# Patient Record
Sex: MALE | Employment: OTHER | URBAN - METROPOLITAN AREA
[De-identification: names, ages, dates, MRNs, and addresses within clinical notes are randomized per-mention and may not be internally consistent; named-entity substitution may affect disease eponyms.]

---

## 2017-03-08 ENCOUNTER — OFFICE VISIT (OUTPATIENT)
Dept: AUDIOLOGY | Facility: CLINIC | Age: 67
End: 2017-03-08

## 2017-03-08 DIAGNOSIS — H90.3 SENSORY HEARING LOSS, BILATERAL: Primary | ICD-10-CM

## 2017-03-08 NOTE — MR AVS SNAPSHOT
After Visit Summary   3/8/2017    oNemí Lassiter    MRN: [id number]           Patient Information     Date Of Birth          1950        Visit Information        Provider Department      3/8/2017 1:40 PM Aubree Hernandez AuD; [facility] Audiology        Today's Diagnoses     Sensory hearing loss, bilateral    -  1       Follow-ups after your visit        Your next 10 appointments already scheduled     Apr 21, 2017  9:30 AM CDT   (Arrive by 9:15 AM)   New Patient Visit with Kota Decker MD   [facility] Ear Nose and Throat ([facility])    [address] [place] [id number]   [phone]            Apr 25, 2017  1:00 PM CDT   (Arrive by 12:45 PM)   Hearing Aid Evaluation with Alexis Blankenship [facility] Audiology ([facility])    [address] [place] [id number]   [phone]           Please see your medical professional for ear cleaning prior to this appointment if you believe wax buildup may be an issue. All patients are required to have a physician's order stating the medical reason for the hearing test. Your doctor can send an electronic order, use their own form or we have provided a form (called Physician's Order for Audiology Services). It states that there is a medical reason for your exam. Without an order you may need to be rescheduled until the order can be obtained.            Apr 26, 2017  2:30 PM CDT   Hearing Aid Fitting with Alexis Blankenship [facility] Audiology ([facility])    [address] [place] [id number]   [phone]            May 19, 2017  3:00 PM CDT   (Arrive by 2:45 PM)   Initial Review Program with Alexis Blankenship [facility] Audiology ([facility])    [address] [place] [phone]   [phone]              Who to contact      Please call your clinic at [phone] to:    Ask questions about your health    Make or cancel appointments    Discuss your medicines    Learn about your test results    Speak to your doctor   If you have compliments or concerns about an experience at your clinic, or if you wish to file a complaint, please contact [facility] Patient Relations at [phone] or email us at [email or web address]         Additional Information About Your Visit        [employer] Information     [employer] is an electronic gateway that provides easy, online access to your medical records. With [employer], you can request a clinic appointment, read your test results, renew a prescription or communicate with your care team.     To sign up for [employer] visit the website at [email or web address]   You will be asked to enter the access code listed below, as well as some personal information. Please follow the directions to create your username and password.     Your access code is: [email or web address]  Expires: 2017 12:04 PM     Your access code will  in 90 days. If you need help or a new code, please contact your [facility] or call [phone] for assistance.        Care EveryWhere ID     This is your Care EveryWhere ID. This could be used by other organizations to access your [place] medical records  [id number]         Blood Pressure from Last 3 Encounters:   No data found for BP    Weight from Last 3 Encounters:   No data found for Wt              We Performed the Following     Hearing Aid Exam, Binaural (36655)     Speech Audiometry Threshold   (29009)        Primary Care Provider    None Specified       No primary provider on file.        Thank you!     Thank you for choosing [facility] AUDIOLOGY  for your care. Our goal is always to provide you with excellent care. Hearing back from our patients is one way we can continue to improve our services. Please take a few  minutes to complete the written survey that you may receive in the mail after your visit with us. Thank you!             Your Updated Medication List - Protect others around you: Learn how to safely use, store and throw away your medicines at www.disposemymeds.org.      Notice  As of 3/8/2017  2:54 PM    You have not been prescribed any medications.

## 2017-03-08 NOTE — PROGRESS NOTES
AUDIOLOGY REPORT    SUBJECTIVE: Noemí Lassiter is a 67 year old male was seen in the Audiology Clinic at  [facility] on 3/08/17 to discuss concerns with hearing and functional communication difficulties. The patient was accompanied by their . Noemí has been seen previously on at an out side clinic on 08/26/2016, and results revealed a borderline normal sloping to moderately-severe sensorineural hearing lossbilaterally.Noemí notes difficulty with communication in a variety of listening situations.    OBJECTIVE:  As speech testing was not completed at patients hearing test, this was completed today. Could not restest hearing as no order in system.    SAT: Right: 25 dB           Left: 20 dB    Word Recognition Testing:  Right:96% using Israeli word list via live voice  Left: 100% using Israeli word list via live voice    Patient is a hearing aid candidate. Patient would like to move forward with a hearing aid evaluation today. Therefore, the patient was presented with different options for amplification to help aid in communication. Discussed styles, levels of technology and monaural vs. binaural fitting.     The hearing aid(s) mutually chosen were:  Binaural: Unitron Stride 700 HS  COLOR: Brown  BATTERY SIZE: 312  EARMOLD/TIPS: half shell    Otoscopy revealed partial obstruction with cerumen bilaterally. Bilateral earmolds were taken without incident.    ASSESSMENT:   No diagnosis found.    Reviewed purchase information and warranty information with patient. The 45 day trial period was explained to patient. The patient was given a copy of the [place] Department of Health consumer brochure on purchasing hearing instruments. Patient risk factors have been provided to the patient in writing prior to the sale of the hearing aid per FDA regulation. The risk factors are also available in the User Instructional Booklet to be presented on the day of the hearing aid fitting. Hearing  aid(s) ordered. Hearing aid evaluation completed.    PLAN: Noemí is scheduled to return in 2-3 weeks for a hearing aid fitting and programming.Due to his insurance he does need to gain medical clearance before fitting. Gave him the option to return to his original doctor, or see one of our ENT's. He would like to be seen here. Purchase agreement will be completed on that date. Please contact this clinic with any questions or concerns.        Nayely Erickson., [facility]  Licensed Audiologist  [place] #[id number]

## 2017-04-14 ENCOUNTER — PRE VISIT (OUTPATIENT)
Dept: OTOLARYNGOLOGY | Facility: CLINIC | Age: 67
End: 2017-04-14

## 2017-04-14 NOTE — TELEPHONE ENCOUNTER
1.  Date/reason for appt:  4/21/17   [facility] Medical Clearance    2.  Referring provider:  Alexis Hernandez    3.  Call to patient (Yes / No - short description):  No, referred.  Records reviewed.  All records are in Epic

## 2017-04-21 ENCOUNTER — OFFICE VISIT (OUTPATIENT)
Dept: OTOLARYNGOLOGY | Facility: CLINIC | Age: 67
End: 2017-04-21

## 2017-04-21 VITALS — BODY MASS INDEX: 28.99 KG/M2 | HEIGHT: 65 IN | WEIGHT: 174 LBS

## 2017-04-21 DIAGNOSIS — H90.5 SNHL (SENSORINEURAL HEARING LOSS): Primary | ICD-10-CM

## 2017-04-21 RX ORDER — ACETAMINOPHEN 325 MG/1
650 TABLET ORAL EVERY 6 HOURS PRN
COMMUNITY

## 2017-04-21 RX ORDER — IBUPROFEN 600 MG/1
600 TABLET, FILM COATED ORAL EVERY 6 HOURS PRN
COMMUNITY
Start: 2016-02-03

## 2017-04-21 RX ORDER — ASPIRIN 81 MG/1
81 TABLET ORAL
COMMUNITY
Start: 2015-07-31 | End: 2017-12-11

## 2017-04-21 RX ORDER — MULTIVITAMIN
TABLET ORAL
COMMUNITY
Start: 2013-10-23

## 2017-04-21 RX ORDER — TAMSULOSIN HYDROCHLORIDE 0.4 MG/1
0.8 CAPSULE ORAL DAILY
COMMUNITY
Start: 2014-12-16

## 2017-04-21 RX ORDER — CYCLOBENZAPRINE HCL 10 MG
10 TABLET ORAL
COMMUNITY
Start: 2014-12-12 | End: 2017-05-04

## 2017-04-21 RX ORDER — VITAMIN B COMPLEX
TABLET ORAL
COMMUNITY
Start: 2015-07-31

## 2017-04-21 RX ORDER — GABAPENTIN 100 MG/1
100 CAPSULE ORAL
COMMUNITY
Start: 2015-04-27 | End: 2017-05-04

## 2017-04-21 RX ORDER — CAPSAICIN 0.025 %
CREAM (GRAM) TOPICAL
COMMUNITY
Start: 2014-12-12 | End: 2017-11-07

## 2017-04-21 RX ORDER — DOXAZOSIN 4 MG/1
4 TABLET ORAL
COMMUNITY
End: 2017-05-04

## 2017-04-21 RX ORDER — METOPROLOL SUCCINATE 50 MG/1
50 TABLET, EXTENDED RELEASE ORAL
COMMUNITY
Start: 2015-07-31 | End: 2017-05-04

## 2017-04-21 RX ORDER — LOSARTAN POTASSIUM AND HYDROCHLOROTHIAZIDE 25; 100 MG/1; MG/1
TABLET ORAL
COMMUNITY
Start: 2015-06-02 | End: 2017-05-04

## 2017-04-21 ASSESSMENT — PAIN SCALES - GENERAL: PAINLEVEL: NO PAIN (0)

## 2017-04-21 NOTE — LETTER
Date:April 24, 2017      Patient was self referred, no letter generated. Do not send.        [facility] Health Information

## 2017-04-21 NOTE — NURSING NOTE
Chief Complaint   Patient presents with     Consult     hearng loss. Pressure in left ear     Fab Wilson LPN

## 2017-04-21 NOTE — LETTER
4/21/2017       RE: Noemí Lassiter  [address]  [facility] [address]     Dear Colleague,    Thank you for referring your patient, Noemí Lassiter, to the [facility] EAR NOSE AND THROAT at [facility]. Please see a copy of my visit note below.    The patient presents with a history of hearing loss in both ears.  The patient reports a progressive hearing loss in both ears over at least the past few years.  The patient denies ear infections, otalgia, otorrhea, dizziness, or tinnitus.  The patient denies sinusitis, rhinitis, facial pain, nasal obstruction or purulent nasal discharge. The patient denies chronic or recurrent tonsillitis, chronic or recurrent pharyngitis. The patient's outside Audiogram and Tympanogram are reviewed with him and these demonstrate bilateral moderate sensorineural hearing loss that is sloping and symmetric to a severe sensorineural hearing loss in the higher frequencies. His word recognition scores are very good and his tympanograms are normal bilaterally.     This patient is seen in consultation as a self referral to my clinic.     All other systems were reviewed and they are either negative or they are not directly pertinent to this Otolaryngology examination.      Past Medical History:    No past medical history on file.    Past Surgical History:    No past surgical history on file.    Medications:      Current Outpatient Prescriptions:      acetaminophen (TYLENOL) 325 MG tablet, Take 650 mg by mouth, Disp: , Rfl:      aspirin EC 81 MG EC tablet, Take 81 mg by mouth, Disp: , Rfl:      capsaicin (ZOSTRIX) 0.025 % CREA cream, , Disp: , Rfl:      cyclobenzaprine (FLEXERIL) 10 MG tablet, Take 10 mg by mouth, Disp: , Rfl:      Multiple Vitamin (DAILY VITES) TABS, TAKE 1 TABLET BY MOUTH EVERY DAY, Disp: , Rfl:      doxazosin (CARDURA) 4 MG tablet, Take 4 mg by mouth, Disp: , Rfl:      gabapentin (NEURONTIN) 100 MG capsule, Take 100 mg by mouth,  Disp: , Rfl:      ibuprofen (ADVIL/MOTRIN) 600 MG tablet, Take 600 mg by mouth, Disp: , Rfl:      losartan-hydrochlorothiazide (HYZAAR) 100-25 MG per tablet, , Disp: , Rfl:      metoprolol (TOPROL-XL) 50 MG 24 hr tablet, Take 50 mg by mouth, Disp: , Rfl:      tamsulosin (FLOMAX) 0.4 MG capsule, Take 0.4 mg by mouth, Disp: , Rfl:      B Complex Vitamins (VITAMIN-B COMPLEX) TABS, , Disp: , Rfl:      cholecalciferol (D 2000) 2000 UNITS tablet, Take 2,000 Units by mouth, Disp: , Rfl:     Allergies:    Review of patient's allergies indicates no known allergies.    Physical Examination:    The patient is a well developed, well nourished male in no apparent distress.  He is normocephalic, atraumatic with pupils equally round and reactive to light.    Oral Cavity Examination:  Normal mucosa with no masses or lesions  Nasal Examination: Normal mucosa with no masses or lesions  Ear Examination: Ear canals clear, tympanic membranes and middle ear spaces normal  Neurological Examination: Facial nerve function intact and symmetric  Integumentary Examination: No lesions on the skin of the head and neck  Neck Examination: No masses or lesions, no lymphadenopathy  Endocrine Examination: Normal thyroid examination    Assessment and Plan:    The patient presents with a history of bilateral sensorineural hearing loss. Based upon his outside Audiogram and Tympanogram, the patient will be referred for a hearing aid consultation and any needed Audiogram and Tympanogram testing as indicated.         Again, thank you for allowing me to participate in the care of your patient.      Sincerely,    Kota Decker MD

## 2017-04-21 NOTE — MR AVS SNAPSHOT
After Visit Summary   4/21/2017    Noemí Lassiter    MRN: [id number]           Patient Information     Date Of Birth          1950        Visit Information        Provider Department      4/21/2017 9:15 AM Kota Decker MD; [employer] [facility] Ear Nose and Throat        Today's Diagnoses     SNHL (sensorineural hearing loss)    -  1      Care Instructions    The patient presents with a history of bilateral sensorineural hearing loss. Based upon his outside Audiogram and Tympanogram, the patient will be referred for a hearing aid consultation and any needed Audiogram and Tympanogram testing as indicated.           Follow-ups after your visit        Your next 10 appointments already scheduled     Apr 26, 2017  2:30 PM CDT   Hearing Aid Fitting with Alexis Blankenship [facility] Audiology ([facility])    [address] [place] [address] [phone]            May 19, 2017  3:00 PM CDT   (Arrive by 2:45 PM)   Initial Review Program with Alexis Blankenship [facility] Audiology ([facility])    [address] [place] [address] [phone]              Who to contact     Please call your clinic at [phone] to:    Ask questions about your health    Make or cancel appointments    Discuss your medicines    Learn about your test results    Speak to your doctor   If you have compliments or concerns about an experience at your clinic, or if you wish to file a complaint, please contact [facility] Patient Relations at [phone] or email us at [email or web address]         Additional Information About Your Visit        MyChart Information     [employer] is an electronic gateway that provides easy, online access to your medical records. With [employer], you can request a clinic appointment, read your test results, renew a prescription or communicate  "with your care team.     To sign up for [place] community healthhart visit the website at [email or web address]   You will be asked to enter the access code listed below, as well as some personal information. Please follow the directions to create your username and password.     Your access code is: [email or web address]  Expires: 2017  1:04 PM     Your access code will  in 90 days. If you need help or a new code, please contact your [facility] or call [phone] for assistance.        Care EveryWhere ID     This is your Care EveryWhere ID. This could be used by other organizations to access your [place] medical records  [id number]        Your Vitals Were     Height BMI (Body Mass Index)                1.65 m (5' 4.96\") 28.99 kg/m2           Blood Pressure from Last 3 Encounters:   No data found for BP    Weight from Last 3 Encounters:   17 78.9 kg (174 lb)              We Performed the Following     AUDIO REVIEW/CONSULT        Primary Care Provider    None Specified       No primary provider on file.        Thank you!     Thank you for choosing [facility] EAR NOSE AND THROAT  for your care. Our goal is always to provide you with excellent care. Hearing back from our patients is one way we can continue to improve our services. Please take a few minutes to complete the written survey that you may receive in the mail after your visit with us. Thank you!             Your Updated Medication List - Protect others around you: Learn how to safely use, store and throw away your medicines at www.disposemymeds.org.          This list is accurate as of: 17  9:46 AM.  Always use your most recent med list.                   Brand Name Dispense Instructions for use    acetaminophen 325 MG tablet    TYLENOL     Take 650 mg by mouth       aspirin EC 81 MG EC tablet      Take 81 mg by mouth       capsaicin 0.025 % Crea cream    ZOSTRIX         cyclobenzaprine 10 MG tablet    FLEXERIL     Take 10 mg by " mouth       D 2000 2000 UNITS tablet   Generic drug:  cholecalciferol      Take 2,000 Units by mouth       DAILY VITES Tabs      TAKE 1 TABLET BY MOUTH EVERY DAY       doxazosin 4 MG tablet    CARDURA     Take 4 mg by mouth       gabapentin 100 MG capsule    NEURONTIN     Take 100 mg by mouth       ibuprofen 600 MG tablet    ADVIL/MOTRIN     Take 600 mg by mouth       losartan-hydrochlorothiazide 100-25 MG per tablet    HYZAAR         metoprolol 50 MG 24 hr tablet    TOPROL-XL     Take 50 mg by mouth       tamsulosin 0.4 MG capsule    FLOMAX     Take 0.4 mg by mouth       Vitamin-B Complex Tabs

## 2017-04-21 NOTE — PROGRESS NOTES
The patient presents with a history of hearing loss in both ears.  The patient reports a progressive hearing loss in both ears over at least the past few years.  The patient denies ear infections, otalgia, otorrhea, dizziness, or tinnitus.  The patient denies sinusitis, rhinitis, facial pain, nasal obstruction or purulent nasal discharge. The patient denies chronic or recurrent tonsillitis, chronic or recurrent pharyngitis. The patient's outside Audiogram and Tympanogram are reviewed with him and these demonstrate bilateral moderate sensorineural hearing loss that is sloping and symmetric to a severe sensorineural hearing loss in the higher frequencies. His word recognition scores are very good and his tympanograms are normal bilaterally.     This patient is seen in consultation as a self referral to my clinic.     All other systems were reviewed and they are either negative or they are not directly pertinent to this Otolaryngology examination.      Past Medical History:    No past medical history on file.    Past Surgical History:    No past surgical history on file.    Medications:      Current Outpatient Prescriptions:      acetaminophen (TYLENOL) 325 MG tablet, Take 650 mg by mouth, Disp: , Rfl:      aspirin EC 81 MG EC tablet, Take 81 mg by mouth, Disp: , Rfl:      capsaicin (ZOSTRIX) 0.025 % CREA cream, , Disp: , Rfl:      cyclobenzaprine (FLEXERIL) 10 MG tablet, Take 10 mg by mouth, Disp: , Rfl:      Multiple Vitamin (DAILY VITES) TABS, TAKE 1 TABLET BY MOUTH EVERY DAY, Disp: , Rfl:      doxazosin (CARDURA) 4 MG tablet, Take 4 mg by mouth, Disp: , Rfl:      gabapentin (NEURONTIN) 100 MG capsule, Take 100 mg by mouth, Disp: , Rfl:      ibuprofen (ADVIL/MOTRIN) 600 MG tablet, Take 600 mg by mouth, Disp: , Rfl:      losartan-hydrochlorothiazide (HYZAAR) 100-25 MG per tablet, , Disp: , Rfl:      metoprolol (TOPROL-XL) 50 MG 24 hr tablet, Take 50 mg by mouth, Disp: , Rfl:      tamsulosin (FLOMAX) 0.4 MG capsule,  Take 0.4 mg by mouth, Disp: , Rfl:      B Complex Vitamins (VITAMIN-B COMPLEX) TABS, , Disp: , Rfl:      cholecalciferol (D 2000) 2000 UNITS tablet, Take 2,000 Units by mouth, Disp: , Rfl:     Allergies:    Review of patient's allergies indicates no known allergies.    Physical Examination:    The patient is a well developed, well nourished male in no apparent distress.  He is normocephalic, atraumatic with pupils equally round and reactive to light.    Oral Cavity Examination:  Normal mucosa with no masses or lesions  Nasal Examination: Normal mucosa with no masses or lesions  Ear Examination: Ear canals clear, tympanic membranes and middle ear spaces normal  Neurological Examination: Facial nerve function intact and symmetric  Integumentary Examination: No lesions on the skin of the head and neck  Neck Examination: No masses or lesions, no lymphadenopathy  Endocrine Examination: Normal thyroid examination    Assessment and Plan:    The patient presents with a history of bilateral sensorineural hearing loss. Based upon his outside Audiogram and Tympanogram, the patient will be referred for a hearing aid consultation and any needed Audiogram and Tympanogram testing as indicated.

## 2017-04-21 NOTE — PATIENT INSTRUCTIONS
The patient presents with a history of bilateral sensorineural hearing loss. Based upon his outside Audiogram and Tympanogram, the patient will be referred for a hearing aid consultation and any needed Audiogram and Tympanogram testing as indicated.

## 2017-04-26 ENCOUNTER — OFFICE VISIT (OUTPATIENT)
Dept: AUDIOLOGY | Facility: CLINIC | Age: 67
End: 2017-04-26

## 2017-04-26 DIAGNOSIS — H90.3 SENSORY HEARING LOSS, BILATERAL: Primary | ICD-10-CM

## 2017-04-26 NOTE — MR AVS SNAPSHOT
After Visit Summary   4/26/2017    Noemí Lassiter    MRN: [id number]           Patient Information     Date Of Birth          1950        Visit Information        Provider Department      4/26/2017 2:15 PM Aubree Hernandez AuD; [facility] Audiology        Today's Diagnoses     Sensory hearing loss, bilateral    -  1       Follow-ups after your visit        Your next 10 appointments already scheduled     May 04, 2017  2:50 PM CDT   (Arrive by 2:35 PM)   New Patient Visit with Bay Miller MD   [facility] ([facility])    [address] [place] [address] [phone]            May 19, 2017  3:00 PM CDT   (Arrive by 2:45 PM)   Initial Review Program with Alexis Blankenship   [facility] Audiology ([facility])    [address] [place] [address] [phone]              Who to contact     Please call your clinic at [phone] to:    Ask questions about your health    Make or cancel appointments    Discuss your medicines    Learn about your test results    Speak to your doctor   If you have compliments or concerns about an experience at your clinic, or if you wish to file a complaint, please contact [facility] Patient Relations at [phone] or email us at [email or web address]         Additional Information About Your Visit        MyChart Information     B[employer] is an electronic gateway that provides easy, online access to your medical records. With [employer], you can request a clinic appointment, read your test results, renew a prescription or communicate with your care team.     To sign up for [employer] visit the website at [email or web address]   You will be asked to enter the access code listed below, as well as some personal information. Please follow the directions to create your username and password.     Your  access code is: [email or web address]  Expires: 2017  6:30 AM     Your access code will  in 90 days. If you need help or a new code, please contact your [facility] Clinic or call [phone] for assistance.        Care EveryWhere ID     This is your Care EveryWhere ID. This could be used by other organizations to access your [place] medical records  [id number]         Blood Pressure from Last 3 Encounters:   No data found for BP    Weight from Last 3 Encounters:   17 78.9 kg (174 lb)              We Performed the Following     Hearing Aid Fitting        Primary Care Provider    None Specified       No primary provider on file.        Thank you!     Thank you for choosing [facility] AUDIOLOGY  for your care. Our goal is always to provide you with excellent care. Hearing back from our patients is one way we can continue to improve our services. Please take a few minutes to complete the written survey that you may receive in the mail after your visit with us. Thank you!             Your Updated Medication List - Protect others around you: Learn how to safely use, store and throw away your medicines at www.disposemymeds.org.          This list is accurate as of: 17  6:23 PM.  Always use your most recent med list.                   Brand Name Dispense Instructions for use    acetaminophen 325 MG tablet    TYLENOL     Take 650 mg by mouth       aspirin EC 81 MG EC tablet      Take 81 mg by mouth       capsaicin 0.025 % Crea cream    ZOSTRIX         cyclobenzaprine 10 MG tablet    FLEXERIL     Take 10 mg by mouth       D 2000 2000 UNITS tablet   Generic drug:  cholecalciferol      Take 2,000 Units by mouth       DAILY VITES Tabs      TAKE 1 TABLET BY MOUTH EVERY DAY       doxazosin 4 MG tablet    CARDURA     Take 4 mg by mouth       gabapentin 100 MG capsule    NEURONTIN     Take 100 mg by mouth       ibuprofen 600 MG tablet    ADVIL/MOTRIN     Take 600 mg by mouth        losartan-hydrochlorothiazide 100-25 MG per tablet    HYZAAR         metoprolol 50 MG 24 hr tablet    TOPROL-XL     Take 50 mg by mouth       tamsulosin 0.4 MG capsule    FLOMAX     Take 0.4 mg by mouth       Vitamin-B Complex Tabs

## 2017-04-26 NOTE — PROGRESS NOTES
AUDIOLOGY REPORT    SUBJECTIVE: Noemí Lassiter is a 67 year old male who was seen in Audiology at the [facility] [place] for a fitting of Unitron Stride 700 half-shell hearing aids. Previous results have revealed a bilateral mild sloping to moderately severe  sensorineural hearing loss. The patient was given medical clearance to pursue amplification by  Kota Decker MD.. Today he was accompanied by an .    OBJECTIVE: The hearing aid conformity evaluation was completed.The hearing aids were placed and they provided a good fit. Real-ear-probe-microphone measurements were completed on the [employer] system and were a good match to NAL-NL1 target with soft sounds audible, moderate sounds comfortable, and loud sounds below discomfort. UCLs are verified through maximum power output measures and demonstrate appropriate limiting of loud inputs. Noemí was oriented to proper hearing aid use, care, cleaning (no water, dry brush), batteries (size 312, insertion/removal, low-battery signal), aid insertion/removal, warranty information, storage cases, and other hearing aid details. The patient confirmed understanding of hearing aid use and care, and showed proper insertion of hearing aid and batteries while in the office today. Noemí reported good volume and sound quality today.   Hearing aids were programmed as follows:  Program 1: Automatic program  Push button was disabled.    ASSESSMENT: Unitron Stride 700 half-shell hearing aids were fit today. Verification measures were performed. Noemí signed the Hearing Aid Purchase Agreement and was given a copy, as well as details on his hearing aids.    PLAN:Noemí will return for follow-up in 2-3 weeks for a hearing aid review appointment, at which time we will review use of the dry jar and wax traps. Please call this clinic with questions regarding today s appointment.    Nayely Erickson., [facility]  Licensed Audiologist  [place]  #[id number]                  +

## 2017-05-04 ENCOUNTER — OFFICE VISIT (OUTPATIENT)
Dept: INTERNAL MEDICINE | Facility: CLINIC | Age: 67
End: 2017-05-04

## 2017-05-04 VITALS
DIASTOLIC BLOOD PRESSURE: 77 MMHG | BODY MASS INDEX: 27.97 KG/M2 | WEIGHT: 174 LBS | SYSTOLIC BLOOD PRESSURE: 144 MMHG | HEIGHT: 66 IN | RESPIRATION RATE: 16 BRPM | HEART RATE: 69 BPM

## 2017-05-04 DIAGNOSIS — E78.5 HYPERLIPIDEMIA, UNSPECIFIED HYPERLIPIDEMIA TYPE: ICD-10-CM

## 2017-05-04 DIAGNOSIS — E11.9 TYPE 2 DIABETES MELLITUS WITHOUT COMPLICATION, WITHOUT LONG-TERM CURRENT USE OF INSULIN (H): ICD-10-CM

## 2017-05-04 DIAGNOSIS — T78.40XA ALLERGIC REACTION, INITIAL ENCOUNTER: ICD-10-CM

## 2017-05-04 DIAGNOSIS — R60.0 LOCALIZED EDEMA: ICD-10-CM

## 2017-05-04 DIAGNOSIS — R30.0 DYSURIA: Primary | ICD-10-CM

## 2017-05-04 DIAGNOSIS — R30.0 DYSURIA: ICD-10-CM

## 2017-05-04 DIAGNOSIS — Z00.00 ENCOUNTER FOR ROUTINE ADULT HEALTH EXAMINATION WITHOUT ABNORMAL FINDINGS: ICD-10-CM

## 2017-05-04 DIAGNOSIS — I10 BENIGN ESSENTIAL HYPERTENSION: ICD-10-CM

## 2017-05-04 LAB
ALBUMIN UR-MCNC: NEGATIVE MG/DL
ANION GAP SERPL CALCULATED.3IONS-SCNC: 7 MMOL/L (ref 3–14)
APPEARANCE UR: CLEAR
BILIRUB UR QL STRIP: NEGATIVE
BUN SERPL-MCNC: 15 MG/DL (ref 7–30)
CALCIUM SERPL-MCNC: 8.9 MG/DL (ref 8.5–10.1)
CHLORIDE SERPL-SCNC: 107 MMOL/L (ref 94–109)
CHOLEST SERPL-MCNC: 163 MG/DL
CO2 SERPL-SCNC: 27 MMOL/L (ref 20–32)
COLOR UR AUTO: YELLOW
CREAT SERPL-MCNC: 0.82 MG/DL (ref 0.66–1.25)
CREAT UR-MCNC: 156 MG/DL
GFR SERPL CREATININE-BSD FRML MDRD: ABNORMAL ML/MIN/1.7M2
GLUCOSE SERPL-MCNC: 120 MG/DL (ref 70–99)
GLUCOSE UR STRIP-MCNC: 50 MG/DL
HBA1C MFR BLD: 7.8 % (ref 4.3–6)
HDLC SERPL-MCNC: 51 MG/DL
HGB UR QL STRIP: NEGATIVE
KETONES UR STRIP-MCNC: NEGATIVE MG/DL
LDLC SERPL CALC-MCNC: 101 MG/DL
LEUKOCYTE ESTERASE UR QL STRIP: NEGATIVE
MICROALBUMIN UR-MCNC: 8 MG/L
MICROALBUMIN/CREAT UR: 4.97 MG/G CR (ref 0–17)
MUCOUS THREADS #/AREA URNS LPF: PRESENT /LPF
NITRATE UR QL: NEGATIVE
NONHDLC SERPL-MCNC: 112 MG/DL
PH UR STRIP: 7 PH (ref 5–7)
POTASSIUM SERPL-SCNC: 4.1 MMOL/L (ref 3.4–5.3)
RBC #/AREA URNS AUTO: 1 /HPF (ref 0–2)
SODIUM SERPL-SCNC: 142 MMOL/L (ref 133–144)
SP GR UR STRIP: 1.02 (ref 1–1.03)
TRIGL SERPL-MCNC: 55 MG/DL
URN SPEC COLLECT METH UR: ABNORMAL
UROBILINOGEN UR STRIP-MCNC: 0 MG/DL (ref 0–2)
WBC #/AREA URNS AUTO: <1 /HPF (ref 0–2)

## 2017-05-04 RX ORDER — HYDROCHLOROTHIAZIDE 12.5 MG/1
25 TABLET ORAL DAILY
Qty: 60 TABLET | Refills: 0 | Status: SHIPPED | OUTPATIENT
Start: 2017-05-04 | End: 2017-10-06

## 2017-05-04 RX ORDER — LOSARTAN POTASSIUM 100 MG/1
100 TABLET ORAL DAILY
Qty: 90 TABLET | Refills: 0 | COMMUNITY
Start: 2017-05-04 | End: 2022-11-18

## 2017-05-04 RX ORDER — CETIRIZINE HYDROCHLORIDE 10 MG/1
10 TABLET ORAL DAILY PRN
Qty: 30 TABLET | COMMUNITY
Start: 2017-05-04 | End: 2017-11-07

## 2017-05-04 RX ORDER — SIMVASTATIN 40 MG
40 TABLET ORAL AT BEDTIME
Qty: 30 TABLET | COMMUNITY
Start: 2017-05-04

## 2017-05-04 RX ORDER — GLIPIZIDE 10 MG/1
10 TABLET ORAL DAILY
Qty: 30 TABLET | COMMUNITY
Start: 2017-05-04 | End: 2017-08-03 | Stop reason: ALTCHOICE

## 2017-05-04 ASSESSMENT — ENCOUNTER SYMPTOMS
MUSCLE CRAMPS: 0
STIFFNESS: 0
ARTHRALGIAS: 0
COUGH: 0
HEMATURIA: 0
NECK PAIN: 0
DYSURIA: 1
MYALGIAS: 0
SNORES LOUDLY: 0
BACK PAIN: 1
SMELL DISTURBANCE: 0
POSTURAL DYSPNEA: 0
DYSPNEA ON EXERTION: 0
COUGH DISTURBING SLEEP: 0
MUSCLE WEAKNESS: 0
NECK MASS: 0
SINUS PAIN: 1
SINUS CONGESTION: 0
TROUBLE SWALLOWING: 0
SORE THROAT: 0
SHORTNESS OF BREATH: 1
TASTE DISTURBANCE: 0
RESPIRATORY PAIN: 0
SPUTUM PRODUCTION: 0
FLANK PAIN: 0
WHEEZING: 1
DIFFICULTY URINATING: 1
HEMOPTYSIS: 0
JOINT SWELLING: 0

## 2017-05-04 ASSESSMENT — ACTIVITIES OF DAILY LIVING (ADL): DO_MEMBERS_OF_YOUR_HOUSEHOLD_WEAR_SEAT_BELTS?: Y

## 2017-05-04 ASSESSMENT — PAIN SCALES - GENERAL: PAINLEVEL: NO PAIN (0)

## 2017-05-04 NOTE — NURSING NOTE
Chief Complaint   Patient presents with     Establish Care     Pt to here to establish care.      Destiny Fuentes LPN May 4, 2017 2:46 PM

## 2017-05-04 NOTE — MR AVS SNAPSHOT
After Visit Summary   5/4/2017    Noemí Lassiter    MRN: [id number]           Patient Information     Date Of Birth          1950        Visit Information        Provider Department      5/4/2017 2:30 PM Bay Miller MD; [facility]        Today's Diagnoses     Dysuria    -  1    Encounter for routine adult health examination without abnormal findings        Type 2 diabetes mellitus without complication, without long-term current use of insulin (H)        Localized edema        Benign essential hypertension          Care Instructions    Primary Care Center Medication Refill Request Information:  * Please contact your pharmacy regarding ANY request for medication refills.  ** [facility] Prescription Fax = [phone]  * Please allow 3 business days for routine medication refills.  * Please allow 5 business days for controlled substance medication refills.     Primary Care Center Test Result notification information:  *You will be notified within 7-10 days of your appointment day regarding the results of your test.  If you are on MyChart you will be notified as soon as the provider has reviewed the results and signed off on them.    Please schedule the following appointments:  Ophthalmology (Eye) [phone]  ([facility] 4th floor)    Diabetes with High Blood Sugar  You have been treated for high blood sugar (hyperglycemia). This may be because of an infection or other illness, eating too many sweets or starches, or not taking enough insulin.  Home care  Monitor and write down your blood sugar level at least twice a day. Do this before breakfast and before dinner. If you take insulin, record your insulin dose as well. Do this for the next 3 to 5 days.  High blood sugar may cause symptoms that you can learn to recognize, such as:    Frequent urination    Thirst    Dizziness    Headache    Nausea or vomiting    Abdominal pain    Drowsiness or loss of  "consciousness  If you experience high-blood-sugar symptoms, use a blood or urine test to find out what your blood sugar level is. If it is above your usual range, use the \"sliding scale\" regular insulin dose prescribed by your healthcare provider. If you were not given a range for your insulin dose, contact your health care provider for more advice.  Follow-up care  Follow up with your health care provider, or as advised. You may need to meet with your health care provider in the next week. You will likely review your blood sugar records. You may also talk to your health care provider about adjusting your dose of insulin or other medicine for blood sugar.  When to seek medical advice  Call your health care provider right away if these occur:    High blood sugar symptoms (Symptoms are described above.)    Blood sugar over 300 mg/dl If you can t reach your health care provider, go to a hospital emergency room.     0463-6903 [employer]. [address] [place] [address]. All rights reserved. This information is not intended as a substitute for professional medical care. Always follow your healthcare professional's instructions.      Established High Blood Pressure    High blood pressure (hypertension) is a chronic disease. Often health care providers don t know what causes it. But it can be caused by certain health conditions and medicines.  If you have high blood pressure, you may not have any symptoms. If you do have symptoms, they may include headache, dizziness, changes in your vision, chest pain, and shortness of breath. But even without symptoms, high blood pressure that s not treated raises your risk for heart attack and stroke. High blood pressure is a serious health risk and shouldn t be ignored.  A blood pressure reading is made up of two numbers: a higher number over a lower number. The top number is the systolic pressure. The bottom number is the diastolic pressure. A normal blood " pressure is less than 120 over less than 80.  High blood pressure is when either the top number is 140 or higher, or the bottom number is 90 or higher. This must be the result when taking your blood pressure a number of times. The blood pressures between normal and high are called prehypertension.  Home care  If you have high blood pressure, you should do what is listed below to lower your blood pressure. If you are taking medicines for high blood pressure, these methods may reduce or end your need for medicines in the future.    Begin a weight-loss program if you are overweight.    Cut back on how much salt you get in your diet. Here s how to do this:    Don t eat foods that have a lot of salt. These include olives, pickles, smoked meats, and salted potato chips.    Don t add salt to your food at the table.    Use only small amounts of salt when cooking.    Begin an exercise program. Talk with your health care provider about the type of exercise program that would be best for you. It doesn't have to be hard. Even brisk walking for 20 minutes 3 times a week is a good form of exercise.    Don t take medicines that have heart stimulants. This includes many cold and sinus decongestant pills and sprays, as well as diet pills. Check the warnings about hypertension on the label. Stimulants such as amphetamine or cocaine could be lethal for someone with high blood pressure. Never take these.    Limit how much caffeine you get in your diet. Switch to caffeine-free products.    Stop smoking. If you are a long-time smoker, this can be hard. Enroll in a stop-smoking program to make it more likely that you will quit for good.    Learn how to handle stress. This is an important part of any program to lower blood pressure. Learn about relaxation methods like meditation, yoga, or biofeedback.    If your provider prescribed medicines, take them exactly as directed. Missing doses may cause your blood pressure get out of  control.    Consider buying an automatic blood pressure machine. You can get one of these at most pharmacies. Use this to watch your blood pressure at home. Give the results to your provider.  Follow-up care  You will need to make regular visits to your health care provider. This is to check your blood pressure and to make changes to your medicines. Make a follow-up appointment as directed.  When to seek medical advice  Call your health care provider right away if any of these occur:    Chest pain or shortness of breath    Severe headache    Throbbing or rushing sound in the ears    Nosebleed    Sudden severe pain in your belly (abdomen)    Extreme drowsiness, confusion, or fainting    Dizziness or dizziness with a spinning sensation (vertigo)    Weakness of an arm or leg or one side of the face    You have problems speaking or seeing     3607-6270 [employer]. [address] [place], [place] [address]. All rights reserved. This information is not intended as a substitute for professional medical care. Always follow your healthcare professional's instructions.        Putting on Compression Stockings     Turn the stocking inside-out, then fit it over your toes and heel.          Roll the stocking up your leg.            Once stockings are on, make sure the top of the stocking is about two fingers  width below the crease of the knee (or the groin if you wear thigh-high stockings).          Use equipment, such as a stocking tracy, or wear rubber gloves to make it easier to put on compression stockings.         Elastic compression stockings are prescribed to treat many vein problems. Wearing them may be the most important thing you do to manage your symptoms. The stockings fit tightly around your ankle, gradually reducing in pressure as they go up your legs. This helps keep blood flowing to your heart. As a result, swelling is reduced. Your doctor will prescribe stockings at a safe pressure for you. He or  she will also tell you how often to wear and remove the stockings. Follow these instructions closely. Also, do not buy or wear compression stockings without first seeing your doctor.  Tips for Wear and Care  To wear stockings safely and to get the most benefit:    Wear the length prescribed by your doctor.    Pull them to the designated height and no farther. Don t let them bunch at the top. This can restrict blood flow and increase swelling.    Wear the stockings for the amount of time your doctor recommends. Replace them when they start to feel loose. This will likely be every 3 to 6 months.    Remove them as your doctor directs. When removed, wash your legs. Then check your legs and feet for sores. Call your doctor if you find a sore. Don t put the stockings back on unless your doctor directs.     Wash the stockings as instructed. They may need to be handwashed.    2755-6280 [employer]. [address] [place] [address]. All rights reserved. This information is not intended as a substitute for professional medical care. Always follow your healthcare professional's instructions.                Follow-ups after your visit        Additional Services     OPHTHALMOLOGY ADULT REFERRAL       Your provider has referred you to: [facility] [place] [phone]   [email or web address]    Please be aware that coverage of these services is subject to the terms and limitations of your health insurance plan.  Call member services at your health plan with any benefit or coverage questions.      Please bring the following with you to your appointment:    (1) Any X-Rays, CTs or MRIs which have been performed.  Contact the facility where they were done to arrange for  prior to your scheduled appointment.    (2) List of current medications  (3) This referral request   (4) Any documents/labs given to you for this referral    Diabetic eye appointment                   Follow-up notes from your care team     Return in about 2 weeks (around 5/18/2017).      Your next 10 appointments already scheduled     May 04, 2017  5:30 PM CDT   LAB with UC LAB   [facility] ([facility])    [address] [place] [id number] [phone]           Patient must bring picture ID.  Patient should be prepared to give a urine specimen  Please do not eat 10-12 hours before your appointment if you are coming in fasting for labs on lipids, cholesterol, or glucose (sugar).  Pregnant women should follow their Care Team instructions. Water with medications is okay. Do not drink coffee or other fluids.   If you have concerns about taking  your medications, please ask at office or if scheduling via [employer], send a message by clicking on Secure Messaging, Message Your Care Team.            May 19, 2017  3:00 PM CDT   (Arrive by 2:45 PM)   Initial Review Program with Alexis Blankenship   [facility] ([facility])    [address] [place] [id number] [phone]            May 25, 2017 12:00 PM CDT   (Arrive by 11:45 AM)   NEW GENERAL with Jo Owen OD   [facility] Ophthalmology ([facility])    [address] [place] [id number] [phone]            May 25, 2017  3:50 PM CDT   (Arrive by 3:35 PM)   Return Visit with Bay Miller MD   [facility] ([facility])    [address] [place] [id number] [phone]              Future tests that were ordered for you today     Open Future Orders        Priority Expected Expires Ordered    Lipid panel reflex to direct LDL Routine 5/4/2017 5/18/2017 5/4/2017    Hemoglobin A1c Routine 5/4/2017 5/18/2017 5/4/2017    UA with Micro reflex to Culture Routine 5/4/2017 5/4/2018 5/4/2017    HCV Screen Routine 5/4/2017 5/4/2018  "2017    Fecal cancer screen FIT Routine 2017    Basic metabolic panel Routine 2017            Who to contact     Please call your clinic at [phone] to:    Ask questions about your health    Make or cancel appointments    Discuss your medicines    Learn about your test results    Speak to your doctor   If you have compliments or concerns about an experience at your clinic, or if you wish to file a complaint, please contact [facility] Patient Relations at [phone] or email us at [email or web address]         Additional Information About Your Visit        [employer] Information     [employer] is an electronic gateway that provides easy, online access to your medical records. With [email or web address], you can request a clinic appointment, read your test results, renew a prescription or communicate with your care team.     To sign up for [email or web address] visit the website at [email or web address]   You will be asked to enter the access code listed below, as well as some personal information. Please follow the directions to create your username and password.     Your access code is: [email or web address]  Expires: 2017  6:30 AM     Your access code will  in 90 days. If you need help or a new code, please contact your [facility] or call [phone] for assistance.        Care EveryWhere ID     This is your Care EveryWhere ID. This could be used by other organizations to access your [facility] medical records  [id number]        Your Vitals Were     Pulse Respirations Height BMI (Body Mass Index)          69 16 1.678 m (5' 6.06\") 28.03 kg/m2         Blood Pressure from Last 3 Encounters:   17 144/77    Weight from Last 3 Encounters:   17 78.9 kg (174 lb)   17 78.9 kg (174 lb)              We Performed the Following     Abdominal Aortic Aneurysm Screening/Tracking     Albumin Random Urine Quantitative  "    Compression stockings     OPHTHALMOLOGY ADULT REFERRAL     Pneumococcal vaccine 13 valent PCV13 IM (Prevnar) [14891]     TDAP VACCINE (BOOSTRIX)          Today's Medication Changes          These changes are accurate as of: 5/4/17  4:38 PM.  If you have any questions, ask your nurse or doctor.               Start taking these medicines.        Dose/Directions    hydrochlorothiazide 12.5 MG Tabs tablet   Used for:  Benign essential hypertension   Started by:  Bay Miller MD        Dose:  25 mg   Take 2 tablets (25 mg) by mouth daily   Quantity:  60 tablet   Refills:  0         These medicines have changed or have updated prescriptions.        Dose/Directions    * aspirin EC 81 MG EC tablet   This may have changed:  Another medication with the same name was added. Make sure you understand how and when to take each.   Changed by:  Kota eDcker MD        Dose:  81 mg   Take 81 mg by mouth   Refills:  0       * aspirin 81 MG tablet   This may have changed:  You were already taking a medication with the same name, and this prescription was added. Make sure you understand how and when to take each.   Used for:  Encounter for routine adult health examination without abnormal findings   Changed by:  Bay Miller MD        Dose:  81 mg   Take 1 tablet (81 mg) by mouth daily   Quantity:  60 tablet   Refills:  0       * Notice:  This list has 2 medication(s) that are the same as other medications prescribed for you. Read the directions carefully, and ask your doctor or other care provider to review them with you.         Where to get your medicines      These medications were sent to [facility], [facility] [place] [id number] [place] [id number] [facility] [address]     Phone:  [phone]     aspirin 81 MG tablet    hydrochlorothiazide 12.5 MG Tabs tablet                Primary Care Provider    None Specified       No primary provider on file.        Thank you!     Thank  you for choosing [facility]  for your care. Our goal is always to provide you with excellent care. Hearing back from our patients is one way we can continue to improve our services. Please take a few minutes to complete the written survey that you may receive in the mail after your visit with us. Thank you!             Your Updated Medication List - Protect others around you: Learn how to safely use, store and throw away your medicines at www.disposemymeds.org.          This list is accurate as of: 5/4/17  4:38 PM.  Always use your most recent med list.                   Brand Name Dispense Instructions for use    acetaminophen 325 MG tablet    TYLENOL     Take 650 mg by mouth       * aspirin EC 81 MG EC tablet      Take 81 mg by mouth       * aspirin 81 MG tablet     60 tablet    Take 1 tablet (81 mg) by mouth daily       capsaicin 0.025 % Crea cream    ZOSTRIX         cyclobenzaprine 10 MG tablet    FLEXERIL     Take 10 mg by mouth       D 2000 2000 UNITS tablet   Generic drug:  cholecalciferol      Take 2,000 Units by mouth       DAILY VITES Tabs      TAKE 1 TABLET BY MOUTH EVERY DAY       doxazosin 4 MG tablet    CARDURA     Take 4 mg by mouth       gabapentin 100 MG capsule    NEURONTIN     Take 100 mg by mouth       hydrochlorothiazide 12.5 MG Tabs tablet     60 tablet    Take 2 tablets (25 mg) by mouth daily       ibuprofen 600 MG tablet    ADVIL/MOTRIN     Take 600 mg by mouth       losartan-hydrochlorothiazide 100-25 MG per tablet    HYZAAR         metoprolol 50 MG 24 hr tablet    TOPROL-XL     Take 50 mg by mouth       tamsulosin 0.4 MG capsule    FLOMAX     Take 0.4 mg by mouth       Vitamin-B Complex Tabs          * Notice:  This list has 2 medication(s) that are the same as other medications prescribed for you. Read the directions carefully, and ask your doctor or other care provider to review them with you.

## 2017-05-04 NOTE — NURSING NOTE
Post-it placed on computer screen for provider informing him of elevated BP. Destiny Fuentes LPN May 4, 2017 3:09 PM

## 2017-05-04 NOTE — PATIENT INSTRUCTIONS
"[facility] Medication Refill Request Information:  * Please contact your pharmacy regarding ANY request for medication refills.  ** [facility] Fax = [phone]  * Please allow 3 business days for routine medication refills.  * Please allow 5 business days for controlled substance medication refills.     [facility] Test Result notification information:  *You will be notified within 7-10 days of your appointment day regarding the results of your test.  If you are on MyChart you will be notified as soon as the provider has reviewed the results and signed off on them.    Please schedule the following appointments:  Ophthalmology (Eye) [phone]  ([facility] [place] 4th [place])    Diabetes with High Blood Sugar  You have been treated for high blood sugar (hyperglycemia). This may be because of an infection or other illness, eating too many sweets or starches, or not taking enough insulin.  Home care  Monitor and write down your blood sugar level at least twice a day. Do this before breakfast and before dinner. If you take insulin, record your insulin dose as well. Do this for the next 3 to 5 days.  High blood sugar may cause symptoms that you can learn to recognize, such as:    Frequent urination    Thirst    Dizziness    Headache    Nausea or vomiting    Abdominal pain    Drowsiness or loss of consciousness  If you experience high-blood-sugar symptoms, use a blood or urine test to find out what your blood sugar level is. If it is above your usual range, use the \"sliding scale\" regular insulin dose prescribed by your healthcare provider. If you were not given a range for your insulin dose, contact your health care provider for more advice.  Follow-up care  Follow up with your health care provider, or as advised. You may need to meet with your health care provider in the next week. You will likely review your blood sugar records. You may also talk to your health care provider about adjusting your dose of " insulin or other medicine for blood sugar.  When to seek medical advice  Call your health care provider right away if these occur:    High blood sugar symptoms (Symptoms are described above.)    Blood sugar over 300 mg/dl If you can t reach your health care provider, go to a hospital emergency room.     8074-1617 [employer]. [address] [place] [address]. All rights reserved. This information is not intended as a substitute for professional medical care. Always follow your healthcare professional's instructions.      Established High Blood Pressure    High blood pressure (hypertension) is a chronic disease. Often health care providers don t know what causes it. But it can be caused by certain health conditions and medicines.  If you have high blood pressure, you may not have any symptoms. If you do have symptoms, they may include headache, dizziness, changes in your vision, chest pain, and shortness of breath. But even without symptoms, high blood pressure that s not treated raises your risk for heart attack and stroke. High blood pressure is a serious health risk and shouldn t be ignored.  A blood pressure reading is made up of two numbers: a higher number over a lower number. The top number is the systolic pressure. The bottom number is the diastolic pressure. A normal blood pressure is less than 120 over less than 80.  High blood pressure is when either the top number is 140 or higher, or the bottom number is 90 or higher. This must be the result when taking your blood pressure a number of times. The blood pressures between normal and high are called prehypertension.  Home care  If you have high blood pressure, you should do what is listed below to lower your blood pressure. If you are taking medicines for high blood pressure, these methods may reduce or end your need for medicines in the future.    Begin a weight-loss program if you are overweight.    Cut back on how much salt you get  in your diet. Here s how to do this:    Don t eat foods that have a lot of salt. These include olives, pickles, smoked meats, and salted potato chips.    Don t add salt to your food at the table.    Use only small amounts of salt when cooking.    Begin an exercise program. Talk with your health care provider about the type of exercise program that would be best for you. It doesn't have to be hard. Even brisk walking for 20 minutes 3 times a week is a good form of exercise.    Don t take medicines that have heart stimulants. This includes many cold and sinus decongestant pills and sprays, as well as diet pills. Check the warnings about hypertension on the label. Stimulants such as amphetamine or cocaine could be lethal for someone with high blood pressure. Never take these.    Limit how much caffeine you get in your diet. Switch to caffeine-free products.    Stop smoking. If you are a long-time smoker, this can be hard. Enroll in a stop-smoking program to make it more likely that you will quit for good.    Learn how to handle stress. This is an important part of any program to lower blood pressure. Learn about relaxation methods like meditation, yoga, or biofeedback.    If your provider prescribed medicines, take them exactly as directed. Missing doses may cause your blood pressure get out of control.    Consider buying an automatic blood pressure machine. You can get one of these at most pharmacies. Use this to watch your blood pressure at home. Give the results to your provider.  Follow-up care  You will need to make regular visits to your health care provider. This is to check your blood pressure and to make changes to your medicines. Make a follow-up appointment as directed.  When to seek medical advice  Call your health care provider right away if any of these occur:    Chest pain or shortness of breath    Severe headache    Throbbing or rushing sound in the ears    Nosebleed    Sudden severe pain in your belly  (abdomen)    Extreme drowsiness, confusion, or fainting    Dizziness or dizziness with a spinning sensation (vertigo)    Weakness of an arm or leg or one side of the face    You have problems speaking or seeing     0281-4694 [employer]. [address] [place] [address], [place] [address]. All rights reserved. This information is not intended as a substitute for professional medical care. Always follow your healthcare professional's instructions.        Putting on Compression Stockings     Turn the stocking inside-out, then fit it over your toes and heel.          Roll the stocking up your leg.            Once stockings are on, make sure the top of the stocking is about two fingers  width below the crease of the knee (or the groin if you wear thigh-high stockings).          Use equipment, such as a stocking tracy, or wear rubber gloves to make it easier to put on compression stockings.         Elastic compression stockings are prescribed to treat many vein problems. Wearing them may be the most important thing you do to manage your symptoms. The stockings fit tightly around your ankle, gradually reducing in pressure as they go up your legs. This helps keep blood flowing to your heart. As a result, swelling is reduced. Your doctor will prescribe stockings at a safe pressure for you. He or she will also tell you how often to wear and remove the stockings. Follow these instructions closely. Also, do not buy or wear compression stockings without first seeing your doctor.  Tips for Wear and Care  To wear stockings safely and to get the most benefit:    Wear the length prescribed by your doctor.    Pull them to the designated height and no farther. Don t let them bunch at the top. This can restrict blood flow and increase swelling.    Wear the stockings for the amount of time your doctor recommends. Replace them when they start to feel loose. This will likely be every 3 to 6 months.    Remove them as your doctor  directs. When removed, wash your legs. Then check your legs and feet for sores. Call your doctor if you find a sore. Don t put the stockings back on unless your doctor directs.     Wash the stockings as instructed. They may need to be handwashed.    2799-8727 [employer]. [address] [place] [address], [place] [address]. All rights reserved. This information is not intended as a substitute for professional medical care. Always follow your healthcare professional's instructions.

## 2017-05-04 NOTE — PROGRESS NOTES
"HPI:  Noemí Lassiter is a 67 year old with history of HTN, DM II, BPH who presents to [facility] care.    Patient does not have any acute issues that he wishes to discuss. Does request a Tdap and a blood test to check on his diabetes. Denies polyuria or polydipsia. Does note some chronic urinary dribbling and dysuria. Notes some LE edema which is worst at the end of the day and when his legs are hanging down. Denies orthopnea or PND.    ROS:  ROS: 10 point ROS neg other than the symptoms noted above in the HPI.    PMH/PSH:  Past Medical History:   Diagnosis Date     BPH (benign prostatic hyperplasia)      DM (diabetes mellitus), type 2 (H)      HLD (hyperlipidemia)      HTN (hypertension)      No past surgical history on file.    FHx/SHx:  No family history on file.  Social History   Substance Use Topics     Smoking status: Never Smoker     Smokeless tobacco: Never Used     Alcohol use No       Meds/Allergies:    Current Outpatient Prescriptions on File Prior to Visit:  acetaminophen (TYLENOL) 325 MG tablet Take 650 mg by mouth   aspirin EC 81 MG EC tablet Take 81 mg by mouth   capsaicin (ZOSTRIX) 0.025 % CREA cream    Multiple Vitamin (DAILY VITES) TABS TAKE 1 TABLET BY MOUTH EVERY DAY   ibuprofen (ADVIL/MOTRIN) 600 MG tablet Take 600 mg by mouth   tamsulosin (FLOMAX) 0.4 MG capsule Take 0.4 mg by mouth   B Complex Vitamins (VITAMIN-B COMPLEX) TABS    cholecalciferol (D 2000) 2000 UNITS tablet Take 2,000 Units by mouth     No current facility-administered medications on file prior to visit.   No Known Allergies    Physical exam:  /77  Pulse 69  Resp 16  Ht 1.678 m (5' 6.06\")  Wt 78.9 kg (174 lb)  BMI 28.03 kg/m2  Body mass index is 28.03 kg/(m^2).   Gen: Pleasant, well-developed, well-nourished and in no apparent distress  HEENT: normocephalic, atraumatic, PERRL, no scleral icterus, corneal arcus mmm, oropharynx clear, b/l TMs normal  Neck: supple, no LAD, no thyromegaly, no nodules  CV: regular rate and " rhythm, normal S1 S2, no S3 or S4 and no murmur, click, or rub  Resp: clear to ausculation bilaterally, normal respiratory effort  Abd: bowel sounds presents, soft. non-tender, non-distended, no masses or hepatosplenomegaly  Ext: WWP, trace LE edema to mid shin  Skin: warm and dry, no rashes or ecchymoses on exposed skin  Neuro: alert and oriented, strength 5+ throughout, CN II-XII grossly intact, normal gait  Psych: normal mood, normal affect    Lab/Imaging:  None    A/P:  Noemí was seen today for establish care.    Diagnoses and all orders for this visit:    Type 2 diabetes mellitus without complication, without long-term current use of insulin (H)  Patient's last a1c 6.4 11/19/2015. Patient prescribed metformin 1000 mg bid and glipizide 10 mg qd but states that he hasn't been taking them recently. Will check a1c to see how well-controlled his diabetes off of medication. Due for microalbumin, ophtho, foot exam. Will address need for medications at follow up in 2 weeks.  -     Hemoglobin A1c; Future  -     OPHTHALMOLOGY ADULT REFERRAL  -     Basic metabolic panel; Future  -     Albumin Random Urine Quantitative; Future  -     Podiatry referral    Localized edema  Symptoms worse at the end of the day and when legs in dependent position. No concerning jaundice, anasarca, orthopnea, PND.  -     Compression stockings    Benign essential hypertension  Patient states that he does take his BP medications regularly. BP today 144/77. Will increase HCTZ to 25 mg, continue losartan.  -     hydrochlorothiazide 12.5 MG TABS tablet; Take 2 tablets (25 mg) by mouth daily        -     Continue losartan 100 mg daily        -     BMP in 2 weeks at follow up    Hyperlipidemia, unspecified hyperlipidemia type  Patient not taking simvastatin. Last lipids 12/17/14: chol 176, HDL 47, , triglycerides 91.        -     Simvastatin 40 mg         -     Lipid panel        -     ASA 81 mg    Encounter for routine adult health  examination without abnormal findings  -     TDAP VACCINE (BOOSTRIX)  -     HCV Screen; Future  -     Pneumococcal vaccine 13 valent PCV13 IM (Prevnar) [07642]  -     Fecal cancer screen FIT; Future  -     Abdominal Aortic Aneurysm Screening/Tracking. AAA screening not needed, never smoker.    Patient seen and discussed with Dr. Wolf who agrees with the plan.    Kota Miller MD  Internal Medicine, PGY-1  Pager [phone]    The Patient was seen in [facility] by    ANDERSON MILLER  Patient was seen and examined by me by the resident.   I reviewed the history & exam. Assessment and plan were jointly made.    Marya Wolf MD

## 2017-05-04 NOTE — NURSING NOTE
AHA BP protocol done. Please see vitals for further details. Chastity Jordan CMA at 3:59 PM on 5/4/2017  Provider notified of elevated pressure reading. Chastity Jordan CMA at 3:59 PM on 5/4/2017

## 2017-05-05 ENCOUNTER — DOCUMENTATION ONLY (OUTPATIENT)
Dept: VASCULAR SURGERY | Facility: CLINIC | Age: 67
End: 2017-05-05

## 2017-05-05 DIAGNOSIS — Z00.00 ENCOUNTER FOR ROUTINE ADULT HEALTH EXAMINATION WITHOUT ABNORMAL FINDINGS: ICD-10-CM

## 2017-05-05 LAB — HCV AB SERPL QL IA: NORMAL

## 2017-05-05 PROCEDURE — 82274 ASSAY TEST FOR BLOOD FECAL: CPT | Performed by: INTERNAL MEDICINE

## 2017-05-07 LAB — HEMOCCULT STL QL IA: NEGATIVE

## 2017-05-19 ENCOUNTER — OFFICE VISIT (OUTPATIENT)
Dept: AUDIOLOGY | Facility: CLINIC | Age: 67
End: 2017-05-19

## 2017-05-19 DIAGNOSIS — H90.3 SENSORY HEARING LOSS, BILATERAL: Primary | ICD-10-CM

## 2017-05-19 NOTE — MR AVS SNAPSHOT
After Visit Summary   5/19/2017    Noemí Lassiter    MRN: [id number]           Patient Information     Date Of Birth          1950        Visit Information        Provider Department      5/19/2017 2:45 PM Isai Nathan; Aubree Hernandez AuD [facility]        Today's Diagnoses     Sensory hearing loss, bilateral    -  1       Follow-ups after your visit        Your next 10 appointments already scheduled     May 25, 2017 12:00 PM CDT   (Arrive by 11:45 AM)   NEW GENERAL with Jo Owen OD   [facility] ([facility] [place])    [address] [place] [id number] [phone]            May 25, 2017  3:50 PM CDT   (Arrive by 3:35 PM)   Return Visit with Bay Miller MD   [facility] ([facility])    [address] [place] [address] [phone]              Who to contact     Please call your clinic at [phone] to:    Ask questions about your health    Make or cancel appointments    Discuss your medicines    Learn about your test results    Speak to your doctor   If you have compliments or concerns about an experience at your clinic, or if you wish to file a complaint, please contact [facility] Patient Relations at [phone] or email us at [email or web address]         Additional Information About Your Visit        MyChart Information     [employer] is an electronic gateway that provides easy, online access to your medical records. With [employer], you can request a clinic appointment, read your test results, renew a prescription or communicate with your care team.     To sign up for [employer] visit the website at [email or web address]   You will be asked to enter the access code listed below, as well as some personal information. Please follow the directions to create your username and password.     Your access code  is: [email or web address]  Expires: 2017  6:30 AM     Your access code will  in 90 days. If you need help or a new code, please contact your [facility] or call [phone] for assistance.        Care EveryWhere ID     This is your Care EveryWhere ID. This could be used by other organizations to access your [facility] medical records  [id number]         Blood Pressure from Last 3 Encounters:   17 144/77    Weight from Last 3 Encounters:   17 78.9 kg (174 lb)   17 78.9 kg (174 lb)              We Performed the Following     Battery for Use in Hearing Device ()        Primary Care Provider Office Phone # Fax #    Bay Miller [phone]       [address] [address]        Thank you!     Thank you for choosing [facility]UD[facility]  for your care. Our goal is always to provide you with excellent care. Hearing back from our patients is one way we can continue to improve our services. Please take a few minutes to complete the written survey that you may receive in the mail after your visit with us. Thank you!             Your Updated Medication List - Protect others around you: Learn how to safely use, store and throw away your medicines at www.disposemymeds.org.          This list is accurate as of: 17  3:49 PM.  Always use your most recent med list.                   Brand Name Dispense Instructions for use    acetaminophen 325 MG tablet    TYLENOL     Take 650 mg by mouth       * aspirin EC 81 MG EC tablet      Take 81 mg by mouth       * aspirin 81 MG tablet     60 tablet    Take 1 tablet (81 mg) by mouth daily       capsaicin 0.025 % Crea cream    ZOSTRIX         cetirizine 10 MG tablet    zyrTEC    30 tablet    Take 1 tablet (10 mg) by mouth daily as needed for allergies       D 2000 2000 UNITS tablet   Generic drug:  cholecalciferol      Take 2,000 Units by mouth       DAILY VITES Tabs      TAKE 1  TABLET BY MOUTH EVERY DAY       glipiZIDE 10 MG tablet    GLUCOTROL    30 tablet    Take 1 tablet (10 mg) by mouth daily       hydrochlorothiazide 12.5 MG Tabs tablet     60 tablet    Take 2 tablets (25 mg) by mouth daily       ibuprofen 600 MG tablet    ADVIL/MOTRIN     Take 600 mg by mouth       losartan 100 MG tablet    COZAAR    90 tablet    Take 1 tablet (100 mg) by mouth daily       metFORMIN 1000 MG tablet    GLUCOPHAGE    60 tablet    Take 1 tablet (1,000 mg) by mouth 2 times daily (with meals)       simvastatin 40 MG tablet    ZOCOR    30 tablet    Take 1 tablet (40 mg) by mouth At Bedtime       tamsulosin 0.4 MG capsule    FLOMAX     Take 0.4 mg by mouth       Vitamin-B Complex Tabs          * Notice:  This list has 2 medication(s) that are the same as other medications prescribed for you. Read the directions carefully, and ask your doctor or other care provider to review them with you.

## 2017-05-19 NOTE — PROGRESS NOTES
"AUDIOLOGY REPORT    BACKGROUND INFORMATION: Noemí Lassiter was seen in the was seen in Audiology at the [facility] [place] on 5/19/2017 for follow-up.  The patient has been seen previously in this clinic and was fit with [employer] on Stride 700 in-the-ear (ITE) hearing aids on 04/26/2017. Patient has a bilateral mild sloping to moderately severe  sensorineural hearing loss. The patient reports good sound quality with the hearing aid(s), however he does feel they are too loud in noise. He also reports trouble getting the left one inserted correctly.    TEST RESULTS AND PROCEDURES: A first follow-up was performed.  Patient reports hearing aid(s) is working well and he has been wearing it only when he goes out as he is afraid to loose them. Insertion was reviewed and practiced with the patient several times.Adjustments were made including decreased low frequencies sin the soft and mid inputs in the \"crows\" and \"noise\" program, wind manager was activated, and the comfort setting was increased for more nosie control and the patient reported he will try these new settings.. Reviewed care, cleaning (no water, dry brush), batteries (size 312, insertion/removal, toxicity, low-battery signal), aid insertion/removal, volume wheel adjustment, user booklet, warranty information, storage cases, and other hearing aid details. 24 units of size 312 batteries dispensed.    SUMMARY AND RECOMMENDATIONS: A first follow-up was performed today. Adjustments were made as noted above and the patient will return as needed or at least every 6-12 months for cleaning and assessment of hearing aid.  Patient wanted to keep hearing aid and is happy with it today. Call this clinic with questions regarding today s visit.          Grazyna Erickson, [facility]  Licensed Audiologist  [place] #[id number]          "

## 2017-05-25 ENCOUNTER — OFFICE VISIT (OUTPATIENT)
Dept: INTERNAL MEDICINE | Facility: CLINIC | Age: 67
End: 2017-05-25

## 2017-05-25 ENCOUNTER — OFFICE VISIT (OUTPATIENT)
Dept: OPHTHALMOLOGY | Facility: CLINIC | Age: 67
End: 2017-05-25
Attending: INTERNAL MEDICINE

## 2017-05-25 VITALS
BODY MASS INDEX: 28.03 KG/M2 | DIASTOLIC BLOOD PRESSURE: 76 MMHG | HEART RATE: 68 BPM | SYSTOLIC BLOOD PRESSURE: 121 MMHG | WEIGHT: 174 LBS

## 2017-05-25 DIAGNOSIS — I10 BENIGN ESSENTIAL HYPERTENSION: Primary | ICD-10-CM

## 2017-05-25 DIAGNOSIS — H52.13 MYOPIA WITH ASTIGMATISM AND PRESBYOPIA, BILATERAL: ICD-10-CM

## 2017-05-25 DIAGNOSIS — H52.4 MYOPIA WITH ASTIGMATISM AND PRESBYOPIA, BILATERAL: ICD-10-CM

## 2017-05-25 DIAGNOSIS — H52.203 MYOPIA WITH ASTIGMATISM AND PRESBYOPIA, BILATERAL: ICD-10-CM

## 2017-05-25 DIAGNOSIS — Z96.1 PSEUDOPHAKIA OF LEFT EYE: ICD-10-CM

## 2017-05-25 DIAGNOSIS — E11.9 TYPE 2 DIABETES MELLITUS WITHOUT COMPLICATION, WITHOUT LONG-TERM CURRENT USE OF INSULIN (H): ICD-10-CM

## 2017-05-25 DIAGNOSIS — H40.012 OAG (OPEN ANGLE GLAUCOMA) SUSPECT, LOW RISK, LEFT: Primary | ICD-10-CM

## 2017-05-25 DIAGNOSIS — I10 BENIGN ESSENTIAL HYPERTENSION: ICD-10-CM

## 2017-05-25 PROBLEM — E78.5 HYPERLIPIDEMIA, UNSPECIFIED HYPERLIPIDEMIA TYPE: Status: ACTIVE | Noted: 2017-05-25

## 2017-05-25 PROBLEM — M79.89 SWELLING OF LIMB: Status: ACTIVE | Noted: 2017-05-25

## 2017-05-25 LAB
ANION GAP SERPL CALCULATED.3IONS-SCNC: 10 MMOL/L (ref 3–14)
BUN SERPL-MCNC: 16 MG/DL (ref 7–30)
CALCIUM SERPL-MCNC: 9.5 MG/DL (ref 8.5–10.1)
CHLORIDE SERPL-SCNC: 103 MMOL/L (ref 94–109)
CO2 SERPL-SCNC: 25 MMOL/L (ref 20–32)
CREAT SERPL-MCNC: 0.81 MG/DL (ref 0.66–1.25)
GFR SERPL CREATININE-BSD FRML MDRD: ABNORMAL ML/MIN/1.7M2
GLUCOSE SERPL-MCNC: 111 MG/DL (ref 70–99)
POTASSIUM SERPL-SCNC: 3.9 MMOL/L (ref 3.4–5.3)
SODIUM SERPL-SCNC: 138 MMOL/L (ref 133–144)
TSH SERPL DL<=0.005 MIU/L-ACNC: 2.7 MU/L (ref 0.4–4)

## 2017-05-25 RX ORDER — METFORMIN HCL 500 MG
500 TABLET, EXTENDED RELEASE 24 HR ORAL
Qty: 30 TABLET | Refills: 3 | Status: SHIPPED | OUTPATIENT
Start: 2017-05-25 | End: 2018-01-12

## 2017-05-25 ASSESSMENT — VISUAL ACUITY
OS_CC: J1+
OS_CC: 20/25
OS_CC+: +2
METHOD: SNELLEN - LINEAR
OD_CC: PROS
CORRECTION_TYPE: GLASSES

## 2017-05-25 ASSESSMENT — REFRACTION_WEARINGRX
OS_ADD: +3.00
OD_SPHERE: --2.00
OS_SPHERE: -3.50
OD_CYLINDER: SPHERE
OS_CYLINDER: +2.25
OS_AXIS: 002
OD_ADD: +2.75
SPECS_TYPE: BIFOCAL

## 2017-05-25 ASSESSMENT — REFRACTION_MANIFEST
OS_ADD: +2.75
OS_AXIS: 002
OS_SPHERE: -3.75
OD_SPHERE: BALANCE
OS_CYLINDER: +1.75

## 2017-05-25 ASSESSMENT — PAIN SCALES - GENERAL: PAINLEVEL: NO PAIN (0)

## 2017-05-25 ASSESSMENT — CUP TO DISC RATIO: OS_RATIO: 0.50

## 2017-05-25 ASSESSMENT — SLIT LAMP EXAM - LIDS
COMMENTS: MILD BLEPH
COMMENTS: MILD BLEPH

## 2017-05-25 ASSESSMENT — CONF VISUAL FIELD
OD_INFERIOR_TEMPORAL_RESTRICTION: 1
OD_INFERIOR_NASAL_RESTRICTION: 1
OS_NORMAL: 1
OD_SUPERIOR_TEMPORAL_RESTRICTION: 1
METHOD: COUNTING FINGERS
OD_SUPERIOR_NASAL_RESTRICTION: 1

## 2017-05-25 ASSESSMENT — TONOMETRY
OS_IOP_MMHG: 23
IOP_METHOD: ICARE
OD_IOP_MMHG: PROS

## 2017-05-25 NOTE — MR AVS SNAPSHOT
After Visit Summary   5/25/2017    Noemí Lassiter    MRN: [id number]           Patient Information     Date Of Birth          1950        Visit Information        Provider Department      5/25/2017 3:35 PM Bay Miller MD; [facility]        Today's Diagnoses     Benign essential hypertension    -  1    Type 2 diabetes mellitus without complication, without long-term current use of insulin (H)          Care Instructions    Primary Care Center Medication Refill Request Information:  * Please contact your pharmacy regarding ANY request for medication refills.  ** [facility] Prescription Fax = [phone]  * Please allow 3 business days for routine medication refills.  * Please allow 5 business days for controlled substance medication refills.     Primary Care Center Test Result notification information:  *You will be notified within 7-10 days of your appointment day regarding the results of your test.  If you are on MyChart you will be notified as soon as the provider has reviewed the results and signed off on them.    Please go to lab for blood tests    Please start taking metformin 500 mg once daily    Please make an appointment with the dietician    Please follow up in 1 month and go to lab before you've eaten to have your blood sugar checked          Follow-ups after your visit        Additional Services     NUTRITION REFERRAL       Your provider has referred you to: [facility]: [facility] (on call location)  - [place] [phone]   [email or web address]    Please be aware that coverage of these services is subject to the terms and limitations of your health insurance plan.  Call member services at your health plan with any benefit or coverage questions.      Please bring the following with you to your appointment:    (1) This referral request  (2) Any documents given to you regarding this referral  (3) Any  specific questions you have about diet and/or food choices                  Follow-up notes from your care team     Return in about 4 weeks (around 6/22/2017).      Your next 10 appointments already scheduled     Jun 23, 2017 10:30 AM CDT   LAB with  LAB   [facility] ([facility])    [address] [place] [id number] [phone]           Patient must bring picture ID.  Patient should be prepared to give a urine specimen  Please do not eat 10-12 hours before your appointment if you are coming in fasting for labs on lipids, cholesterol, or glucose (sugar).  Pregnant women should follow their Care Team instructions. Water with medications is okay. Do not drink coffee or other fluids.   If you have concerns about taking  your medications, please ask at office or if scheduling via [employer], send a message by clicking on Secure Messaging, Message Your Care Team.            Jun 23, 2017 11:00 AM CDT   (Arrive by 10:45 AM)   Return Visit with Lupe Fuentes MD   [facility] ([facility])    [address] [place] [address] [phone]              Future tests that were ordered for you today     Open Future Orders        Priority Expected Expires Ordered    Glucose Routine 6/22/2017 5/25/2018 5/25/2017    Basic metabolic panel Routine 5/25/2017 6/8/2017 5/25/2017    TSH with free T4 reflex Routine 5/25/2017 6/8/2017 5/25/2017            Who to contact     Please call your clinic at [phone] to:    Ask questions about your health    Make or cancel appointments    Discuss your medicines    Learn about your test results    Speak to your doctor   If you have compliments or concerns about an experience at your clinic, or if you wish to file a complaint, please contact [facility] Patient Relations at [phone] or email us at [email or web address]          Additional Information About Your Visit        Plurality Information     Plurality is an electronic gateway that provides easy, online access to your medical records. With Plurality, you can request a clinic appointment, read your test results, renew a prescription or communicate with your care team.     To sign up for Plurality visit the website at [email or web address]   You will be asked to enter the access code listed below, as well as some personal information. Please follow the directions to create your username and password.     Your access code is: [email or web address]  Expires: 2017  6:30 AM     Your access code will  in 90 days. If you need help or a new code, please contact your [facility] or call [phone] for assistance.        Care EveryWhere ID     This is your Care EveryWhere ID. This could be used by other organizations to access your [place] medical records  [id number]        Your Vitals Were     Pulse BMI (Body Mass Index)                68 28.03 kg/m2           Blood Pressure from Last 3 Encounters:   17 121/76   17 144/77    Weight from Last 3 Encounters:   17 78.9 kg (174 lb)   17 78.9 kg (174 lb)   17 78.9 kg (174 lb)              We Performed the Following     ALCOHOL WIPES PER BOX     NUTRITION REFERRAL          Today's Medication Changes          These changes are accurate as of: 17  5:01 PM.  If you have any questions, ask your nurse or doctor.               These medicines have changed or have updated prescriptions.        Dose/Directions    * metFORMIN 1000 MG tablet   Commonly known as:  GLUCOPHAGE   This may have changed:  Another medication with the same name was added. Make sure you understand how and when to take each.   Used for:  Type 2 diabetes mellitus without complication, without long-term current use of insulin (H)   Changed by:  Bay Miller MD        Dose:  1000 mg   Take 1 tablet (1,000 mg)  by mouth 2 times daily (with meals)   Quantity:  60 tablet   Refills:  0       * metFORMIN 500 MG 24 hr tablet   Commonly known as:  GLUCOPHAGE-XR   This may have changed:  You were already taking a medication with the same name, and this prescription was added. Make sure you understand how and when to take each.   Used for:  Type 2 diabetes mellitus without complication, without long-term current use of insulin (H)   Changed by:  Bay Miller MD        Dose:  500 mg   Take 1 tablet (500 mg) by mouth daily (with dinner)   Quantity:  30 tablet   Refills:  3       * Notice:  This list has 2 medication(s) that are the same as other medications prescribed for you. Read the directions carefully, and ask your doctor or other care provider to review them with you.         Where to get your medicines      These medications were sent to [facility] [address]  [address] [facility] [address]     Phone:  [phone]     metFORMIN 500 MG 24 hr tablet                Primary Care Provider Office Phone # Fax #    Bay Miller [phone]       [address]  [facility] [address]        Thank you!     Thank you for choosing [facility]  for your care. Our goal is always to provide you with excellent care. Hearing back from our patients is one way we can continue to improve our services. Please take a few minutes to complete the written survey that you may receive in the mail after your visit with us. Thank you!             Your Updated Medication List - Protect others around you: Learn how to safely use, store and throw away your medicines at www.disposemymeds.org.          This list is accurate as of: 5/25/17  5:01 PM.  Always use your most recent med list.                   Brand Name Dispense Instructions for use    acetaminophen 325 MG tablet    TYLENOL     Take 650 mg by mouth       * aspirin EC 81 MG EC  tablet      Take 81 mg by mouth       * aspirin 81 MG tablet     60 tablet    Take 1 tablet (81 mg) by mouth daily       capsaicin 0.025 % Crea cream    ZOSTRIX         cetirizine 10 MG tablet    zyrTEC    30 tablet    Take 1 tablet (10 mg) by mouth daily as needed for allergies       D 2000 2000 UNITS tablet   Generic drug:  cholecalciferol      Take 2,000 Units by mouth       DAILY VITES Tabs      TAKE 1 TABLET BY MOUTH EVERY DAY       glipiZIDE 10 MG tablet    GLUCOTROL    30 tablet    Take 1 tablet (10 mg) by mouth daily       hydrochlorothiazide 12.5 MG Tabs tablet     60 tablet    Take 2 tablets (25 mg) by mouth daily       ibuprofen 600 MG tablet    ADVIL/MOTRIN     Take 600 mg by mouth       losartan 100 MG tablet    COZAAR    90 tablet    Take 1 tablet (100 mg) by mouth daily       * metFORMIN 1000 MG tablet    GLUCOPHAGE    60 tablet    Take 1 tablet (1,000 mg) by mouth 2 times daily (with meals)       * metFORMIN 500 MG 24 hr tablet    GLUCOPHAGE-XR    30 tablet    Take 1 tablet (500 mg) by mouth daily (with dinner)       simvastatin 40 MG tablet    ZOCOR    30 tablet    Take 1 tablet (40 mg) by mouth At Bedtime       tamsulosin 0.4 MG capsule    FLOMAX     Take 0.4 mg by mouth       Vitamin-B Complex Tabs          * Notice:  This list has 4 medication(s) that are the same as other medications prescribed for you. Read the directions carefully, and ask your doctor or other care provider to review them with you.

## 2017-05-25 NOTE — NURSING NOTE
Chief Complaints and History of Present Illnesses   Patient presents with     Diabetic Eye Exam     annual exam     HPI    Affected eye(s):  Both   Symptoms:     No blurred vision      Frequency:  Constant       Do you have eye pain now?:  No      Comments:  Patient states that vision has been stable since last ey exam both eyes. Denies eye pain. He uses ATs PRN for dryness or irritation. Glasses are about a year old and may need an update.    Patient states that he has had 4 surgeries on the right eye and that they removed the eye because it was very painful after cataract surgery. He cannot see from the right eye. Left eye has had cataract surgery.    Julienne Rojas COT 11:54 AM May 25, 2017

## 2017-05-25 NOTE — MR AVS SNAPSHOT
After Visit Summary   2017    Noemí Lassiter    MRN: [id number]           Patient Information     Date Of Birth          1950        Visit Information        Provider Department      2017 11:45 AM Jo Owen, OD; [employer] [employer] [facility]        Today's Diagnoses     OAG (open angle glaucoma) suspect, low risk, left    -  1    Pseudophakia of left eye        Myopia with astigmatism and presbyopia, bilateral           Follow-ups after your visit        Your next 10 appointments already scheduled     May 25, 2017  3:35 PM CDT   Return Visit with Bay Miller MD   [facility] ([facility])    [address]  4th Floor  [facility] [address] [phone]              Who to contact     Please call your clinic at [phone] to:    Ask questions about your health    Make or cancel appointments    Discuss your medicines    Learn about your test results    Speak to your doctor   If you have compliments or concerns about an experience at your clinic, or if you wish to file a complaint, please contact [facility] Patient Relations at [phone] or email us at [email or web address]         Additional Information About Your Visit        MyChart Information     [employer] is an electronic gateway that provides easy, online access to your medical records. With [employer], you can request a clinic appointment, read your test results, renew a prescription or communicate with your care team.     To sign up for [employer] visit the website at [email or web address]   You will be asked to enter the access code listed below, as well as some personal information. Please follow the directions to create your username and password.     Your access code is: [email or web address]  Expires: 2017  6:30 AM     Your access code will  in 90 days. If you need help or a new code, please  contact your [facility] or call [phone] for assistance.        Care EveryWhere ID     This is your Care EveryWhere ID. This could be used by other organizations to access your [place] medical records  [id number]         Blood Pressure from Last 3 Encounters:   05/04/17 144/77    Weight from Last 3 Encounters:   05/04/17 78.9 kg (174 lb)   04/21/17 78.9 kg (174 lb)              We Performed the Following     OCT Optic Nerve RNFL Spectralis OS (left        Primary Care Provider Office Phone # Fax #    Bay Miller [phone]       [facility] [address]  [facility] [address]        Thank you!     Thank you for choosing [facility]  for your care. Our goal is always to provide you with excellent care. Hearing back from our patients is one way we can continue to improve our services. Please take a few minutes to complete the written survey that you may receive in the mail after your visit with us. Thank you!             Your Updated Medication List - Protect others around you: Learn how to safely use, store and throw away your medicines at www.disposemymeds.org.          This list is accurate as of: 5/25/17  2:45 PM.  Always use your most recent med list.                   Brand Name Dispense Instructions for use    acetaminophen 325 MG tablet    TYLENOL     Take 650 mg by mouth       * aspirin EC 81 MG EC tablet      Take 81 mg by mouth       * aspirin 81 MG tablet     60 tablet    Take 1 tablet (81 mg) by mouth daily       capsaicin 0.025 % Crea cream    ZOSTRIX         cetirizine 10 MG tablet    zyrTEC    30 tablet    Take 1 tablet (10 mg) by mouth daily as needed for allergies       D 2000 2000 UNITS tablet   Generic drug:  cholecalciferol      Take 2,000 Units by mouth       DAILY VITES Tabs      TAKE 1 TABLET BY MOUTH EVERY DAY       glipiZIDE 10 MG tablet    GLUCOTROL    30 tablet    Take 1 tablet (10 mg) by mouth daily        hydrochlorothiazide 12.5 MG Tabs tablet     60 tablet    Take 2 tablets (25 mg) by mouth daily       ibuprofen 600 MG tablet    ADVIL/MOTRIN     Take 600 mg by mouth       losartan 100 MG tablet    COZAAR    90 tablet    Take 1 tablet (100 mg) by mouth daily       metFORMIN 1000 MG tablet    GLUCOPHAGE    60 tablet    Take 1 tablet (1,000 mg) by mouth 2 times daily (with meals)       simvastatin 40 MG tablet    ZOCOR    30 tablet    Take 1 tablet (40 mg) by mouth At Bedtime       tamsulosin 0.4 MG capsule    FLOMAX     Take 0.4 mg by mouth       Vitamin-B Complex Tabs          * Notice:  This list has 2 medication(s) that are the same as other medications prescribed for you. Read the directions carefully, and ask your doctor or other care provider to review them with you.

## 2017-05-25 NOTE — PROGRESS NOTES
A/P  1.) Monocular with enucleated right eye  -Per pt, h/o multiple surgeries and enucleation following blind painful eye  -Current prosthesis with mild buildup, rec removing and cleaning    2.) Pseudophakia OS  -Corrects well (20/25), updated spec Rx for polycarb  -Rec full time glasses wear    3.) Glaucoma suspect  -Borderline high IOP (23)  -Nerve and baseline OCT normal  -Continue to monitor    RTC 1 year, sooner prn for any changes in vision    I have confirmed the patient's CC, HPI and reviewed Past Medical History, Past Surgical History, Social History, Family History, Problem List, Medication List and agree with Tech note.     Jo Owen, LALA FAAO

## 2017-05-25 NOTE — PATIENT INSTRUCTIONS
Primary Care Center Medication Refill Request Information:  * Please contact your pharmacy regarding ANY request for medication refills.  ** [facility] Prescription Fax = [phone]  * Please allow 3 business days for routine medication refills.  * Please allow 5 business days for controlled substance medication refills.     Primary Care Center Test Result notification information:  *You will be notified within 7-10 days of your appointment day regarding the results of your test.  If you are on MyChart you will be notified as soon as the provider has reviewed the results and signed off on them.    Please go to lab for blood tests    Please start taking metformin 500 mg once daily    Please make an appointment with the dietician    Please follow up in 1 month and go to lab before you've eaten to have your blood sugar checked

## 2017-05-25 NOTE — NURSING NOTE
Chief Complaint   Patient presents with     Recheck Medication     Pt is here to follow up on medications.      Destiny Fuentes LPN May 25, 2017 3:27 PM

## 2017-07-05 ENCOUNTER — ALLIED HEALTH/NURSE VISIT (OUTPATIENT)
Dept: EDUCATION SERVICES | Facility: CLINIC | Age: 67
End: 2017-07-05
Attending: INTERNAL MEDICINE

## 2017-07-05 VITALS — HEIGHT: 66 IN | BODY MASS INDEX: 27.64 KG/M2 | WEIGHT: 172 LBS

## 2017-07-05 DIAGNOSIS — E11.9 TYPE 2 DIABETES MELLITUS WITHOUT COMPLICATION, WITHOUT LONG-TERM CURRENT USE OF INSULIN (H): ICD-10-CM

## 2017-07-05 DIAGNOSIS — E11.9 DIABETES MELLITUS WITHOUT COMPLICATION (H): Primary | ICD-10-CM

## 2017-07-05 LAB — GLUCOSE SERPL-MCNC: 93 MG/DL (ref 70–99)

## 2017-07-05 NOTE — PATIENT INSTRUCTIONS
1. We reviewed healthy [place] diet today  2. Check your blood sugar once per day, alternate fasting blood sugar one day and the next day check 2 hours after dinner  3. Your blood sugar goals are  and 2 hours after dinner less than 180  4. Follow up September 8th at 9:30 with Christianne LEWISE and 10:30 with Rosalinda Ladd RD, LD, CDE  Diabetes Care  [address]  [address]  [address]  Phone: [phone]  Appointment line: [phone]  Email: [email or web address]

## 2017-07-05 NOTE — PROGRESS NOTES
"  Diabetes Self Management Training: Individual Review Visit    Noemí Lassiter presents today for education related to Type 2 diabetes.    He is accompanied by     Patient Problem List and Family Medical History reviewed for relevant medical history, current medical status, and diabetes risk factors.    Current Diabetes Management per Patient:  Taking diabetes medications? 500 mg metformin ER      Past Diabetes Education: No    Patient glucose self monitoring as follows: a few times a week.   BG results: fasting glucose- 68, pre-lunch glucose- 135, 242 and bedtime- 135, 194, 124, 111, 92     Vitals:  Ht 1.678 m (5' 6.06\")  Wt 78 kg (172 lb)  BMI 27.71 kg/m2  Estimated body mass index is 27.71 kg/(m^2) as calculated from the following:    Height as of this encounter: 1.678 m (5' 6.06\").    Weight as of this encounter: 78 kg (172 lb).   Last 3 BP:   BP Readings from Last 3 Encounters:   05/25/17 121/76   05/04/17 144/77     History   Smoking Status     Never Smoker   Smokeless Tobacco     Never Used       Labs:  Lab Results   Component Value Date    A1C 7.8 05/04/2017     Lab Results   Component Value Date    GLC 93 07/05/2017     Lab Results   Component Value Date     05/04/2017     HDL Cholesterol   Date Value Ref Range Status   05/04/2017 51 >39 mg/dL Final   ]  GFR Estimate   Date Value Ref Range Status   05/25/2017 >90  Non  GFR Calc   >60 mL/min/1.7m2 Final     GFR Estimate If Black   Date Value Ref Range Status   05/25/2017 >90   GFR Calc   >60 mL/min/1.7m2 Final     Lab Results   Component Value Date    CR 0.81 05/25/2017     No results found for: MICROALBUMIN    Nutrition Review:  Noemí is here with the [facility]  for diabetes education for type 2 diabetes. Noemí tells me he was dx with his type 2 diabetes last year but did not go to diabetes education classes. He tells me he was taking 1000 mg metformin and 10 mg glipizide after initial diagnosis " but felt dizzy so he stopped. He tells me a doctor told him later that he did not have diabetes. He recently saw a new PCP and his HbA1c was 7.8%. I informed Noemí today that he has diabetes and he will always have type 2 diabetes. He brings his bg meter and his metformin with him today. He lives alone, attends [facility] 4 days/wk from 8AM-1 or 2PM. He also has a Home Health worker 2 x/wk for 4 hours/day who cooks for him.     Diet Recall:   Breakfast:2 injera/oil or ambullo with oil and a small amount of sugar or jarret bread/pb with tea with 1 tsp sugar  AM snack:nothing  Lunch:Traditional Senegalese: pasta/rice/soor or 3/4 Bahraini injera or pocket bread with meat/veg stew, or sugaar, salad, milk or water or juice   PM snack:nothing  Dinner:soor or ambullo with 2% milk or cereal/milk  Evening snack:nothing    Eats out in restaurants: about rarely Senegalese  Beverages: water, juice, tea with sugar or Splenda  ETOH: none   Physical Activity:    No regular      Reviewed heart healthy Senegalese diet and portion control today with Noemí using the Senegalese carb counting guide. Circled portions of foods which would be adequate to eat, focusing on 3 meals and small snacks if he desires. Encouraged Noemí to try to walk a bit each day. Reviewed bg testing frequency and bg goals, test once/day alternating fasting and 2 hours after dinner with  fasting and less than 180 2 hours after dinner bg goals. Instructed Noemí to take his metformin with dinner.    Education provided today on:  AADE Self-Care Behaviors:  Healthy Eating: weight reduction, heart healthy diet and portion control  Being Active: describe appropriate activity program  Monitoring: individual blood glucose targets and frequency of monitoring  Taking Medication: when to take medications    Pt verbalized understanding of concepts discussed and recommendations provided today.           ASSESSMENT: Noemí verbalized basic recommendations today regarding healthy diet  and portion control. Review of bg data reveals random testing times due to Ramadan therefore difficult to interpret. Needs full diabetes education, f/u appts were scheduled today.       PLAN:  See Patient Instructions for co-developed, patient-stated behavior change goals.  AVS printed and provided to patient today.    FOLLOW-UP:  Follow-up appointment scheduled on September 8th with RNCDE and myself.  Chart routed to referring provider.    Time Spent: 60 minutes  Encounter Type: Individual    Any diabetes medication dose changes were made via the CDE Protocol and Collaborative Practice Agreement with the patient's referring provider. A copy of this encounter was shared with the provider.

## 2017-07-05 NOTE — MR AVS SNAPSHOT
After Visit Summary   7/5/2017    Noemí Lassiter    MRN: [id number]           Patient Information     Date Of Birth          1950        Visit Information        Provider Department      7/5/2017 12:15 PM Rosalinda Ladd RD; [facility]        Care Instructions    1. We reviewed healthy Spanish diet today  2. Check your blood sugar once per day, alternate fasting blood sugar one day and the next day check 2 hours after dinner  3. Your blood sugar goals are  and 2 hours after dinner less than 180  4. Follow up September 8th at 9:30 with Christianne Wu RNCDE and 10:30 with Rosalinda Ladd RD, LD, CDE  Diabetes Care  [address]  [address] [place] [address]  Phone: [phone]  Appointment line: [phone]  Email: [email or web address]              Follow-ups after your visit        Who to contact     Please call your clinic at [phone] to:    Ask questions about your health    Make or cancel appointments    Discuss your medicines    Learn about your test results    Speak to your doctor   If you have compliments or concerns about an experience at your clinic, or if you wish to file a complaint, please contact [facility] Patient Relations at [phone] or email us at [email or web address]         Additional Information About Your Visit        MyChart Information     [employer] is an electronic gateway that provides easy, online access to your medical records. With [employer], you can request a clinic appointment, read your test results, renew a prescription or communicate with your care team.     To sign up for [employer] visit the website at [email or web address]   You will be asked to enter the access code listed below, as well as some personal information. Please follow the directions to create your username and password.     Your access code is:  [email or web address]  Expires: 2017  6:30 AM     Your access code will  in 90 days. If you need help or a new code, please contact your [facility] or call [phone] for assistance.        Care EveryWhere ID     This is your Care EveryWhere ID. This could be used by other organizations to access your [place] medical records  [id number]         Blood Pressure from Last 3 Encounters:   17 121/76   17 144/77    Weight from Last 3 Encounters:   17 78.9 kg (174 lb)   17 78.9 kg (174 lb)   17 78.9 kg (174 lb)              Today, you had the following     No orders found for display       Primary Care Provider Office Phone # Fax #    Bay Miller [phone]       [facility] [address] [facility] [address]  [facility] [address]        Equal Access to Services     OFELIA ALEXIS AH: Hadii aad ku hadasho Soomaali, waaxda luqadaha, qaybta kaalmada adeegyada, waxay idiin hayevertonn reza khmica hassan . So [facility] [phone].    ATENCIÓN: Si habla español, tiene a kern disposición servicios gratuitos de asistencia lingüística. Llame al [phone].    We comply with applicable federal civil rights laws and [place] laws. We do not discriminate on the basis of race, color, national origin, age, disability sex, sexual orientation or gender identity.            Thank you!     Thank you for choosing [facility]  for your care. Our goal is always to provide you with excellent care. Hearing back from our patients is one way we can continue to improve our services. Please take a few minutes to complete the written survey that you may receive in the mail after your visit with us. Thank you!             Your Updated Medication List - Protect others around you: Learn how to safely use, store and throw away your medicines at www.disposemymeds.org.          This list is accurate as of: 17  1:39 PM.  Always use your most recent med list.                    Brand Name Dispense Instructions for use Diagnosis    acetaminophen 325 MG tablet    TYLENOL     Take 650 mg by mouth        * aspirin EC 81 MG EC tablet      Take 81 mg by mouth        * aspirin 81 MG tablet     60 tablet    Take 1 tablet (81 mg) by mouth daily    Encounter for routine adult health examination without abnormal findings       capsaicin 0.025 % Crea cream    ZOSTRIX          cetirizine 10 MG tablet    zyrTEC    30 tablet    Take 1 tablet (10 mg) by mouth daily as needed for allergies    Allergic reaction, initial encounter       D 2000 2000 UNITS tablet   Generic drug:  cholecalciferol      Take 2,000 Units by mouth        DAILY VITES Tabs      TAKE 1 TABLET BY MOUTH EVERY DAY        glipiZIDE 10 MG tablet    GLUCOTROL    30 tablet    Take 1 tablet (10 mg) by mouth daily    Type 2 diabetes mellitus without complication, without long-term current use of insulin (H)       hydrochlorothiazide 12.5 MG Tabs tablet     60 tablet    Take 2 tablets (25 mg) by mouth daily    Benign essential hypertension       ibuprofen 600 MG tablet    ADVIL/MOTRIN     Take 600 mg by mouth        losartan 100 MG tablet    COZAAR    90 tablet    Take 1 tablet (100 mg) by mouth daily    Benign essential hypertension       * metFORMIN 1000 MG tablet    GLUCOPHAGE    60 tablet    Take 1 tablet (1,000 mg) by mouth 2 times daily (with meals)    Type 2 diabetes mellitus without complication, without long-term current use of insulin (H)       * metFORMIN 500 MG 24 hr tablet    GLUCOPHAGE-XR    30 tablet    Take 1 tablet (500 mg) by mouth daily (with dinner)    Type 2 diabetes mellitus without complication, without long-term current use of insulin (H)       simvastatin 40 MG tablet    ZOCOR    30 tablet    Take 1 tablet (40 mg) by mouth At Bedtime    Hyperlipidemia, unspecified hyperlipidemia type       tamsulosin 0.4 MG capsule    FLOMAX     Take 0.4 mg by mouth        Vitamin-B Complex Tabs           * Notice:  This list has 4  medication(s) that are the same as other medications prescribed for you. Read the directions carefully, and ask your doctor or other care provider to review them with you.

## 2017-08-03 ENCOUNTER — TELEPHONE (OUTPATIENT)
Dept: INTERNAL MEDICINE | Facility: CLINIC | Age: 67
End: 2017-08-03

## 2017-08-03 ENCOUNTER — OFFICE VISIT (OUTPATIENT)
Dept: INTERNAL MEDICINE | Facility: CLINIC | Age: 67
End: 2017-08-03

## 2017-08-03 VITALS
SYSTOLIC BLOOD PRESSURE: 157 MMHG | HEART RATE: 83 BPM | DIASTOLIC BLOOD PRESSURE: 74 MMHG | OXYGEN SATURATION: 100 % | WEIGHT: 171 LBS | RESPIRATION RATE: 18 BRPM | BODY MASS INDEX: 27.55 KG/M2

## 2017-08-03 DIAGNOSIS — E11.9 TYPE 2 DIABETES MELLITUS WITHOUT COMPLICATION, WITHOUT LONG-TERM CURRENT USE OF INSULIN (H): ICD-10-CM

## 2017-08-03 DIAGNOSIS — I10 BENIGN ESSENTIAL HYPERTENSION: ICD-10-CM

## 2017-08-03 DIAGNOSIS — Z13.89 SCREENING FOR DIABETIC PERIPHERAL NEUROPATHY: Primary | ICD-10-CM

## 2017-08-03 DIAGNOSIS — Z00.00 ENCOUNTER FOR ROUTINE ADULT HEALTH EXAMINATION WITHOUT ABNORMAL FINDINGS: ICD-10-CM

## 2017-08-03 LAB — HBA1C MFR BLD: 7 % (ref 4.3–6)

## 2017-08-03 RX ORDER — GABAPENTIN 100 MG/1
100 CAPSULE ORAL AT BEDTIME
Qty: 90 CAPSULE | Refills: 0 | Status: SHIPPED | OUTPATIENT
Start: 2017-08-03 | End: 2017-11-07

## 2017-08-03 ASSESSMENT — PAIN SCALES - GENERAL: PAINLEVEL: MODERATE PAIN (4)

## 2017-08-03 NOTE — MR AVS SNAPSHOT
After Visit Summary   8/3/2017    Noemí Lassiter    MRN: [id number]           Patient Information     Date Of Birth          1950        Visit Information        Provider Department      8/3/2017 9:45 AM Bay Miller MD; [facility]        Today's Diagnoses     Screening for diabetic peripheral neuropathy    -  1    Type 2 diabetes mellitus without complication, without long-term current use of insulin (H)        Encounter for routine adult health examination without abnormal findings          Care Instructions    Primary Care Center Medication Refill Request Information:  * Please contact your pharmacy regarding ANY request for medication refills.  ** [facility] Prescription Fax = [phone]  * Please allow 3 business days for routine medication refills.  * Please allow 5 business days for controlled substance medication refills.     Primary Care Center Test Result notification information:  *You will be notified with in 7-10 days of your appointment day regarding the results of your test.  If you are on MyChart you will be notified as soon as the provider has reviewed the results and signed off on them.    Primary Care Center [phone]   Dental [phone] ([facility]               Follow-ups after your visit        Additional Services     DENTAL REFERRAL       Your provider has referred you to: [facility]: Dental Clinic [facility] [phone]   [email or web address]    Type: checkup  Urgency: Routine    Please be aware that coverage of these services is subject to the terms and limitations of your health insurance plan.  Call member services at your health plan with any benefit or coverage questions.      Please bring the following with you to your appointment:    (1) Any X-Rays, CTs or MRIs which have been performed.  Contact the facility where they were done to arrange for  prior to your scheduled  appointment.  Any new CT, MRI or other procedures ordered by your specialist must be performed at a [place] facility or coordinated by your clinic's referral office.    (2) List of current medications   (3) This referral request   (4) Any documents/labs given to you for this referral            ORTHOTICS REFERRAL       **This referral order prints off in the [facility]  (Orthotics & Prosthetics) Central Scheduling Office**    The [facility] Central Scheduling Staff will contact the patient to schedule appointments.     Central Scheduling Contact Information: [phone] ([place])    Orthotics: Bilateral diabetic Shoe/s    Please be aware that coverage of these services is subject to the terms and limitations of your health insurance plan.  Call member services at your health plan with any benefit or coverage questions.      Please bring the following to your appointment:    >>   Any x-rays, CTs or MRIs which have been performed.  Contact the facility where they were done to arrange for  prior to your scheduled appointment.    >>   List of current medications   >>   This referral request   >>   Any documents/labs given to you for this referral                  Follow-up notes from your care team     Return in about 3 weeks (around 8/24/2017).      Your next 10 appointments already scheduled     Aug 03, 2017 11:15 AM CDT   LAB with  LAB    Health Lab ([facility])    [address] [place] [id number] [phone]           Patient must bring picture ID. Patient should be prepared to give a urine specimen  Please do not eat 10-12 hours before your appointment if you are coming in fasting for labs on lipids, cholesterol, or glucose (sugar). Pregnant women should follow their Care Team instructions. Water with medications is okay. Do not drink coffee or other fluids. If you have concerns about taking  your medications, please ask at  office or if scheduling via [employer], send a message by clicking on Secure Messaging, Message Your Care Team.            Aug 24, 2017  2:40 PM CDT   (Arrive by 2:25 PM)   Return Visit with Bay Miller MD   [facility] ([facility])    [address] [place] [id number] [phone]            Sep 08, 2017  9:30 AM CDT   (Arrive by 9:15 AM)   Office Visit with Christianne Wu RN   [facility] Diabetes ([facility])    [address] [place] [id number] [phone]           Bring a current list of meds and any records pertaining to this visit. For Physicals, please bring immunization records and any forms needing to be filled out. Please arrive 10 minutes early to complete paperwork.            Sep 08, 2017 10:30 PM CDT   (Arrive by 10:15 PM)   Office Visit with Rosalinda Ladd RD   [facility] ([facility])    [address] [place] [id number] [phone]           Bring a current list of meds and any records pertaining to this visit. For Physicals, please bring immunization records and any forms needing to be filled out. Please arrive 10 minutes early to complete paperwork.              Future tests that were ordered for you today     Open Future Orders        Priority Expected Expires Ordered    Hemoglobin A1c Routine 8/3/2017 8/17/2017 8/3/2017            Who to contact     Please call your clinic at [phone] to:    Ask questions about your health    Make or cancel appointments    Discuss your medicines    Learn about your test results    Speak to your doctor   If you have compliments or concerns about an experience at your clinic, or if you wish to file a complaint, please contact [facility] Patient Relations at [phone] or email us at [email or web address]         Additional Information About  Your Visit        Artist Growth Information     [employer] is an electronic gateway that provides easy, online access to your medical records. With [employer], you can request a clinic appointment, read your test results, renew a prescription or communicate with your care team.     To sign up for [employer] visit the website at [email or web address]   You will be asked to enter the access code listed below, as well as some personal information. Please follow the directions to create your username and password.     Your access code is: [email or web address]  Expires: 10/31/2017  6:30 AM     Your access code will  in 90 days. If you need help or a new code, please contact your [facility] or call [phone] for assistance.        Care EveryWhere ID     This is your Care EveryWhere ID. This could be used by other organizations to access your [place] medical records  [id number]        Your Vitals Were     Pulse Respirations Pulse Oximetry BMI (Body Mass Index)          83 18 100% 27.55 kg/m2         Blood Pressure from Last 3 Encounters:   17 157/74   17 121/76   17 144/77    Weight from Last 3 Encounters:   17 77.6 kg (171 lb)   17 78 kg (172 lb)   17 78.9 kg (174 lb)              We Performed the Following     DENTAL REFERRAL     FOOT EXAM - NO CHARGE     ORTHOTICS REFERRAL          Today's Medication Changes          These changes are accurate as of: 8/3/17 10:59 AM.  If you have any questions, ask your nurse or doctor.               Start taking these medicines.        Dose/Directions    gabapentin 100 MG capsule   Commonly known as:  NEURONTIN   Used for:  Type 2 diabetes mellitus without complication, without long-term current use of insulin (H)   Started by:  Bay Miller MD        Dose:  100 mg   Take 1 capsule (100 mg) by mouth At Bedtime   Quantity:  90 capsule   Refills:  0            Where to get your medicines      These medications were sent to  [facility], [facility] [place], [place] - [address]  [address] [address]     Phone:  [phone]     gabapentin 100 MG capsule                Primary Care Provider Office Phone # Fax #    Bay Miller [phone]       [address]  [facility] [address]        Equal Access to Services     OFELIA ALEXIS : Hadii aad ku hadasho Soomaali, waaxda luqadaha, qaybta kaalmada adeegyada, waxay idiin hayaan adeeg kharash la'aan ah. So [facility] [phone].    ATENCIÓN: Si habla español, tiene a kern disposición servicios gratuitos de asistencia lingüística. Zheng al [phone].    We comply with applicable federal civil rights laws and [place] laws. We do not discriminate on the basis of race, color, national origin, age, disability sex, sexual orientation or gender identity.            Thank you!     Thank you for choosing [facility]  for your care. Our goal is always to provide you with excellent care. Hearing back from our patients is one way we can continue to improve our services. Please take a few minutes to complete the written survey that you may receive in the mail after your visit with us. Thank you!             Your Updated Medication List - Protect others around you: Learn how to safely use, store and throw away your medicines at www.disposemymeds.org.          This list is accurate as of: 8/3/17 10:59 AM.  Always use your most recent med list.                   Brand Name Dispense Instructions for use Diagnosis    acetaminophen 325 MG tablet    TYLENOL     Take 650 mg by mouth        * aspirin EC 81 MG EC tablet      Take 81 mg by mouth        * aspirin 81 MG tablet     60 tablet    Take 1 tablet (81 mg) by mouth daily    Encounter for routine adult health examination without abnormal findings       capsaicin 0.025 % Crea cream    ZOSTRIX          cetirizine 10 MG tablet    zyrTEC    30 tablet    Take 1 tablet (10  mg) by mouth daily as needed for allergies    Allergic reaction, initial encounter       D 2000 2000 UNITS tablet   Generic drug:  cholecalciferol      Take 2,000 Units by mouth        DAILY VITES Tabs      TAKE 1 TABLET BY MOUTH EVERY DAY        gabapentin 100 MG capsule    NEURONTIN    90 capsule    Take 1 capsule (100 mg) by mouth At Bedtime    Type 2 diabetes mellitus without complication, without long-term current use of insulin (H)       glipiZIDE 10 MG tablet    GLUCOTROL    30 tablet    Take 1 tablet (10 mg) by mouth daily    Type 2 diabetes mellitus without complication, without long-term current use of insulin (H)       hydrochlorothiazide 12.5 MG Tabs tablet     60 tablet    Take 2 tablets (25 mg) by mouth daily    Benign essential hypertension       ibuprofen 600 MG tablet    ADVIL/MOTRIN     Take 600 mg by mouth        losartan 100 MG tablet    COZAAR    90 tablet    Take 1 tablet (100 mg) by mouth daily    Benign essential hypertension       * metFORMIN 1000 MG tablet    GLUCOPHAGE    60 tablet    Take 1 tablet (1,000 mg) by mouth 2 times daily (with meals)    Type 2 diabetes mellitus without complication, without long-term current use of insulin (H)       * metFORMIN 500 MG 24 hr tablet    GLUCOPHAGE-XR    30 tablet    Take 1 tablet (500 mg) by mouth daily (with dinner)    Type 2 diabetes mellitus without complication, without long-term current use of insulin (H)       simvastatin 40 MG tablet    ZOCOR    30 tablet    Take 1 tablet (40 mg) by mouth At Bedtime    Hyperlipidemia, unspecified hyperlipidemia type       tamsulosin 0.4 MG capsule    FLOMAX     Take 0.4 mg by mouth        Vitamin-B Complex Tabs           * Notice:  This list has 4 medication(s) that are the same as other medications prescribed for you. Read the directions carefully, and ask your doctor or other care provider to review them with you.

## 2017-08-03 NOTE — PROGRESS NOTES
PRIMARY CARE CENTER       SUBJECTIVE:  Noemí Lassiter is a 67 year old male with a PMHx of DM II, HTN, BPH who comes in for diabetes and BP checks.    Patient admits that he has not been taking his metformin nor his HCTZ-lisnopril regularly. States that his BG have been running 120s-140s usually with occasional spikes to the 200s. Last a1c 7.8 5/4. Does not take his metformin when his BG is in the 120s-140s. Has also stopped taking his anti hypertensive medication regularly. With inconsistently taking his metformin, patient was confused about the plan for his HCTZ and losartan. Does not worsening b/l feet burning and is only able to wear open toed shoes. Would like new pair of diabetic shoes.    Medications and allergies reviewed by me today.     ROS:   Constitutional, HEENT, cardiovascular, pulmonary, gi and gu systems are negative, except as otherwise noted.    OBJECTIVE:/74  Pulse 83  Resp 18  Wt 77.6 kg (171 lb)  SpO2 100%  BMI 27.55 kg/m2   Wt Readings from Last 1 Encounters:   08/03/17 77.6 kg (171 lb)     GEN: pleasant, nad  HEENT: nc/at, eomi, anicteric  CV: rrr, no m/r/g  PULM: ctab, breathing comfortably on RA  EXT: full sensation to microfilament exam except for heel     ASSESSMENT/PLAN:    Noemí was seen today for diabetes.    Diagnoses and all orders for this visit:    Type 2 diabetes mellitus without complication, without long-term current use of insulin (H)  Screening for diabetic peripheral neuropathy  Patient has been inconsistently taking his metformin but his BG have not been terribly controlled on glucometer (120s-140s). Recheck a1c to assess long term control. Reiterated importance of taking his metformin regularly regardless of his blood glucose. Likely having some diabetic peripheral neuropathy given b/l foot burning though has sensation intact to microfilament, trial low dose gabapentin.  -     ORTHOTICS REFERRAL  -     gabapentin (NEURONTIN) 100 MG capsule;  Take 1 capsule (100 mg) by mouth At Bedtime  -     Hemoglobin A1c; Future        -     FOOT EXAM - NO CHARGE        -     Follow up as scheduled with diabetes educator 9/8/17        -     Discuss annual eye exam at follow up    Benign essential hypertension  Patient has also been inconsistently taking his antihypertensive medications likely resulting in /74 today in clinic. Reiterated importance of taking losartan and HCTZ.        -      BP recheck at follow up    Encounter for routine adult health examination without abnormal findings  Patient request dental referral for routine checkup.  -     DENTAL REFERRAL     Pt should return to clinic for f/u with me in 3 weeks    Bay Miller MD  Aug 3, 2017    Pt was seen and plan of care discussed with Dr Up.

## 2017-08-03 NOTE — TELEPHONE ENCOUNTER
"Call to pt, message given to pt thru , verbalize understanding.  Adelia Vargas RN  -------------------------      Message left for pt to call back.  Adelia Vargas RN        ----- Message from Bay Miller MD sent at 8/3/2017  1:12 PM CDT -----  Regarding: result  Harry Delvalle!    Could you call Mr Lassiter with a [facility]  and report:    \"Your diabetes level is under better control. Keep up the good work! Please continue to take your metformin regularly regardless of your blood sugar level.\"    Thanks!  Bay    "

## 2017-08-03 NOTE — NURSING NOTE
Chief Complaint   Patient presents with     Diabetes     Patient is here to follow up on diabetes.      Gisella Alvarenga LPN at 10:08 AM on 8/3/2017.

## 2017-08-03 NOTE — PATIENT INSTRUCTIONS
Primary Care Center Medication Refill Request Information:  * Please contact your pharmacy regarding ANY request for medication refills.  ** [facility] Prescription Fax = [phone]  * Please allow 3 business days for routine medication refills.  * Please allow 5 business days for controlled substance medication refills.     [facility] Test Result notification information:  *You will be notified with in 7-10 days of your appointment day regarding the results of your test.  If you are on MyChart you will be notified as soon as the provider has reviewed the results and signed off on them.    Primary Care Center [phone]   Dental [phone] ([facility])

## 2017-08-03 NOTE — NURSING NOTE
All instructions,information and questions were done with the assistance of an interpretor.Sushila Lea LPN 11:02 AM on 8/3/2017

## 2017-08-10 NOTE — TELEPHONE ENCOUNTER
"----- Message from Bay Miller MD sent at 8/3/2017  1:12 PM CDT -----  Regarding: result  Hi Adelia!    Could you call Mr Lassiter with a [facility]  and report:    \"Your diabetes level is under better control. Keep up the good work! Please continue to take your metformin regularly regardless of your blood sugar level.\"    Thanks!  Bay  "

## 2017-08-10 NOTE — PROGRESS NOTES
Attestation:  I, Lupe Fuentes, saw this patient with the resident and agree with the resident s findings and plan of care as documented in the resident s note.      Lupe Fuentes MD

## 2017-08-24 ENCOUNTER — OFFICE VISIT (OUTPATIENT)
Dept: INTERNAL MEDICINE | Facility: CLINIC | Age: 67
End: 2017-08-24

## 2017-08-24 VITALS
DIASTOLIC BLOOD PRESSURE: 74 MMHG | WEIGHT: 175.8 LBS | BODY MASS INDEX: 28.32 KG/M2 | HEART RATE: 62 BPM | SYSTOLIC BLOOD PRESSURE: 145 MMHG

## 2017-08-24 DIAGNOSIS — I10 BENIGN ESSENTIAL HYPERTENSION: ICD-10-CM

## 2017-08-24 DIAGNOSIS — E11.9 TYPE 2 DIABETES MELLITUS WITHOUT COMPLICATION, WITHOUT LONG-TERM CURRENT USE OF INSULIN (H): Primary | ICD-10-CM

## 2017-08-24 ASSESSMENT — PAIN SCALES - GENERAL: PAINLEVEL: NO PAIN (0)

## 2017-08-24 NOTE — MR AVS SNAPSHOT
After Visit Summary   8/24/2017    Noemí Lassiter    MRN: [id number]           Patient Information     Date Of Birth          1950        Visit Information        Provider Department      8/24/2017 2:25 PM Bay Miller MD; [facility]        Today's Diagnoses     Type 2 diabetes mellitus without complication, without long-term current use of insulin (H)    -  1    Benign essential hypertension           Follow-ups after your visit        Follow-up notes from your care team     Return in about 5 weeks (around 9/28/2017).      Your next 10 appointments already scheduled     Sep 08, 2017  9:30 AM CDT   (Arrive by 9:15 AM)   Office Visit with Christianne Wu RN   [facility] ([facility])    [address] [place] [address] [phone]           Bring a current list of meds and any records pertaining to this visit. For Physicals, please bring immunization records and any forms needing to be filled out. Please arrive 10 minutes early to complete paperwork.            Sep 08, 2017 10:30 AM CDT   (Arrive by 10:15 AM)   Office Visit with Rosalinda Ladd RD   [facility] ([facility])    [address] [place] [address] [phone]           Bring a current list of meds and any records pertaining to this visit. For Physicals, please bring immunization records and any forms needing to be filled out. Please arrive 10 minutes early to complete paperwork.              Who to contact     Please call your clinic at [phone] to:    Ask questions about your health    Make or cancel appointments    Discuss your medicines    Learn about your test results    Speak to your doctor   If you have compliments or concerns about an experience at your clinic, or if you wish to file a complaint, please contact [facility] Patient  Relations at [phone] or email us at [email or web address]         Additional Information About Your Visit        anfix Information     anfix is an electronic gateway that provides easy, online access to your medical records. With anfix, you can request a clinic appointment, read your test results, renew a prescription or communicate with your care team.     To sign up for anfix visit the website at [email or web address]   You will be asked to enter the access code listed below, as well as some personal information. Please follow the directions to create your username and password.     Your access code is: [email or web address]  Expires: 10/31/2017  6:30 AM     Your access code will  in 90 days. If you need help or a new code, please contact your [facility] or call [phone] for assistance.        Care EveryWhere ID     This is your Care EveryWhere ID. This could be used by other organizations to access your [facility] medical records  [id number]        Your Vitals Were     Pulse BMI (Body Mass Index)                62 28.32 kg/m2           Blood Pressure from Last 3 Encounters:   17 145/74   17 157/74   17 121/76    Weight from Last 3 Encounters:   17 79.7 kg (175 lb 12.8 oz)   17 77.6 kg (171 lb)   17 78 kg (172 lb)              Today, you had the following     No orders found for display         Today's Medication Changes          These changes are accurate as of: 17 11:59 PM.  If you have any questions, ask your nurse or doctor.               Start taking these medicines.        Dose/Directions    blood glucose monitoring test strip   Commonly known as:  no brand specified   Used for:  Type 2 diabetes mellitus without complication, without long-term current use of insulin (H)   Started by:  Bay Miller MD        Use to test blood sugars twice daily or as directed   Quantity:  100 strip   Refills:  3             Where to get your medicines      These medications were sent to [facility] - [place], [place] - [address] [facility]  [address] [address]     Phone:  [phone]     blood glucose monitoring test strip                Primary Care Provider Office Phone # Fax #    Bay Miller [phone]       [address]  [facility] [address]        Equal Access to Services     OFELIA ALEXIS : Hadii aad ku hadasho Soomaali, waaxda luqadaha, qaybta kaalmada adeegyada, waxay idiin hayaan adeeg kharash lamary valladares. So [facility] [phone].    ATENCIÓN: Si habla español, tiene a kren disposición servicios gratuitos de asistencia lingüística. Zheng al [phone].    We comply with applicable federal civil rights laws and [place] laws. We do not discriminate on the basis of race, color, national origin, age, disability sex, sexual orientation or gender identity.            Thank you!     Thank you for choosing [facility]  for your care. Our goal is always to provide you with excellent care. Hearing back from our patients is one way we can continue to improve our services. Please take a few minutes to complete the written survey that you may receive in the mail after your visit with us. Thank you!             Your Updated Medication List - Protect others around you: Learn how to safely use, store and throw away your medicines at www.disposemymeds.org.          This list is accurate as of: 8/24/17 11:59 PM.  Always use your most recent med list.                   Brand Name Dispense Instructions for use Diagnosis    acetaminophen 325 MG tablet    TYLENOL     Take 650 mg by mouth        * aspirin EC 81 MG EC tablet      Take 81 mg by mouth        * aspirin 81 MG tablet     60 tablet    Take 1 tablet (81 mg) by mouth daily    Encounter for routine adult health examination without abnormal findings       blood glucose monitoring test strip     no brand specified    100 strip    Use to test blood sugars twice daily or as directed    Type 2 diabetes mellitus without complication, without long-term current use of insulin (H)       capsaicin 0.025 % Crea cream    ZOSTRIX          cetirizine 10 MG tablet    zyrTEC    30 tablet    Take 1 tablet (10 mg) by mouth daily as needed for allergies    Allergic reaction, initial encounter       D 2000 2000 UNITS tablet   Generic drug:  cholecalciferol      Take 2,000 Units by mouth        DAILY VITES Tabs      TAKE 1 TABLET BY MOUTH EVERY DAY        gabapentin 100 MG capsule    NEURONTIN    90 capsule    Take 1 capsule (100 mg) by mouth At Bedtime    Type 2 diabetes mellitus without complication, without long-term current use of insulin (H)       hydrochlorothiazide 12.5 MG Tabs tablet     60 tablet    Take 2 tablets (25 mg) by mouth daily    Benign essential hypertension       ibuprofen 600 MG tablet    ADVIL/MOTRIN     Take 600 mg by mouth        losartan 100 MG tablet    COZAAR    90 tablet    Take 1 tablet (100 mg) by mouth daily    Benign essential hypertension       metFORMIN 500 MG 24 hr tablet    GLUCOPHAGE-XR    30 tablet    Take 1 tablet (500 mg) by mouth daily (with dinner)    Type 2 diabetes mellitus without complication, without long-term current use of insulin (H)       simvastatin 40 MG tablet    ZOCOR    30 tablet    Take 1 tablet (40 mg) by mouth At Bedtime    Hyperlipidemia, unspecified hyperlipidemia type       tamsulosin 0.4 MG capsule    FLOMAX     Take 0.4 mg by mouth        Vitamin-B Complex Tabs           * Notice:  This list has 2 medication(s) that are the same as other medications prescribed for you. Read the directions carefully, and ask your doctor or other care provider to review them with you.

## 2017-08-24 NOTE — NURSING NOTE
Chief Complaint   Patient presents with     Diabetes     Patient here for a diabetic check.     Tawny Sosa LPN at 3:05 PM on 8/24/2017.

## 2017-08-24 NOTE — PROGRESS NOTES
PRIMARY CARE CENTER       SUBJECTIVE:  Noemí Lassiter is a 67 year old male with a PMHx of DM II, HTN, BPH who comes in for BP and diabetes follow up.    Diabetes Follow-up    Patient is checking blood sugars: twice daily.    Blood sugar testing frequency justification: Adjustment of medication(s)  Results are as follows:       am - 99-130s             -Last A1C was   Lab Results   Component Value Date    A1C 7.0 08/03/2017    A1C 7.8 05/04/2017        Diabetic concerns: None    Chest Pain or exercise related calf pain (claudication):no     Symptoms of hypoglycemia (low blood sugar): none     Paresthesias (numbness or burning in feet) or sores: No   Diabetic eye exam within the last year?: No,   Gabapentin has been effective, burning has improved.    Hypertension Follow-up      Outpatient blood pressures are being checked at home.  Results are 120s-130s systolic.    Chest Pain? :No     Low Salt Diet: no added salt    Daily NSAID Use?No     Did patient take their HTN pills today/last night as usual?  Yes       Adherence and Exercise  Medication side effects: no  How often is a medication missed? 1 time per week  Exercise:walking  4-5 days/week, gym 2 days per week    Medications and allergies reviewed by me today.     ROS:   Constitutional, HEENT, cardiovascular, pulmonary, gi and gu systems are negative, except as otherwise noted.    OBJECTIVE:/74  Pulse 62  Wt 79.7 kg (175 lb 12.8 oz)  BMI 28.32 kg/m2   Wt Readings from Last 1 Encounters:   08/24/17 79.7 kg (175 lb 12.8 oz)       GENERAL APPEARANCE: healthy, alert and no distress     EYES: EOMI,  PERRL     HENT: ear canals and TM's normal and nose and mouth without ulcers or lesions     NECK: no adenopathy, no asymmetry, masses, or scars and thyroid normal to palpation     RESP: lungs clear to auscultation - no rales, rhonchi or wheezes     CV: regular rates and rhythm, normal S1 S2, no S3 or S4 and no murmur, click or rub     ABDOMEN:   soft, nontender, no HSM or masses and bowel sounds normal     MS: extremities normal- no gross deformities noted, no evidence of inflammation in joints, FROM in all extremities.     SKIN: no suspicious lesions or rashes     NEURO: Normal strength and tone, sensory exam grossly normal, mentation intact and speech normal     PSYCH: mentation appears normal. and affect normal/bright     ASSESSMENT/PLAN:    Noemí was seen today for diabetes.    Diagnoses and all orders for this visit:    Type 2 diabetes mellitus without complication, without long-term current use of insulin (H)  Patient's diabetes is much better controlled, most recent a1c 7.0. AM blood glucoses are well-controlled Expressed congratulations regarding improvement. Patient expressed happiness as well. Med compliance has significantly improved. Does still miss doses of diabetic medications but significantly less than previously. Reinforced the importance of not missing medications and that BG of 99 was not too low. Up to date on diabetes monitoring. Did have ophtho exam 5/25/2017. Does not need refills.  -     blood glucose monitoring (NO BRAND SPECIFIED) test strip; Use to test blood sugars twice daily or as directed  - May need lancets, will check with his pharmacy and inform us if he does.    Benign essential hypertension  Patient's BP better controlled. SBP 120s-130s at home. Patient taking medication regularly and does not miss a dose. Does not need refills.   - Continue losartan and HCTZ   - Continue to take BP at home    Pt should return to clinic for f/u with me in 1 month     Bay Miller MD  Aug 24, 2017    Pt was seen and plan of care discussed with Dr. Engle.   Mr Lassiter 's history was presented and his case discussed with the resident Dr Miller . I have reviewed the note and the plan fopr future care and I agree.  Pop Engle MD

## 2017-08-24 NOTE — NURSING NOTE
AHA blood pressure taken see vitals for results. Results given to provider pt tolerated well.  Danae Arce CMA at 3:48 PM on 8/24/2017.

## 2017-09-08 ENCOUNTER — OFFICE VISIT (OUTPATIENT)
Dept: EDUCATION SERVICES | Facility: CLINIC | Age: 67
End: 2017-09-08

## 2017-09-08 VITALS — WEIGHT: 173.7 LBS | BODY MASS INDEX: 27.92 KG/M2 | HEIGHT: 66 IN

## 2017-09-08 DIAGNOSIS — E11.9 TYPE 2 DIABETES MELLITUS WITHOUT COMPLICATION, WITHOUT LONG-TERM CURRENT USE OF INSULIN (H): Primary | ICD-10-CM

## 2017-09-08 DIAGNOSIS — E11.9 DIABETES MELLITUS WITHOUT COMPLICATION (H): Primary | ICD-10-CM

## 2017-09-08 NOTE — MR AVS SNAPSHOT
After Visit Summary   2017    Noemí Lassiter    MRN: [id number]           Patient Information     Date Of Birth          1950        Visit Information        Provider Department      2017 9:15 AM Christianne Wu, RN; [place] [employer] [facility]        Today's Diagnoses     Type 2 diabetes mellitus without complication, without long-term current use of insulin (H)    -  1       Follow-ups after your visit        Your next 10 appointments already scheduled     Sep 15, 2017 10:15 AM CDT   Programmable Hearing Aid Check with Sanjuana Sifuentes [facility] ([facility])    [address] [place] [address]  [address] [facility] [address] [phone]              Who to contact     Please call your clinic at [phone] to:    Ask questions about your health    Make or cancel appointments    Discuss your medicines    Learn about your test results    Speak to your doctor   If you have compliments or concerns about an experience at your clinic, or if you wish to file a complaint, please contact [facility] Patient Relations at [phone] or email us at [email or web address]         Additional Information About Your Visit        MyChart Information     [employer] System[employer]art is an electronic gateway that provides easy, online access to your medical records. With Tourvia.me, you can request a clinic appointment, read your test results, renew a prescription or communicate with your care team.     To sign up for [employer] visit the website at [email or web address]   You will be asked to enter the access code listed below, as well as some personal information. Please follow the directions to create your username and password.     Your access code is: [email or web address]  Expires: 10/31/2017  6:30 AM     Your access code will  in 90 days. If you need help or a new code, please contact your [facility]  Clinic or call [phone] for assistance.        Care EveryWhere ID     This is your Care EveryWhere ID. This could be used by other organizations to access your [place] medical records  [id number]        Your Vitals Were     BMI (Body Mass Index)                   27.98 kg/m2            Blood Pressure from Last 3 Encounters:   08/24/17 145/74   08/03/17 157/74   05/25/17 121/76    Weight from Last 3 Encounters:   09/08/17 78.8 kg (173 lb 11.2 oz)   09/08/17 78.8 kg (173 lb 11.2 oz)   08/24/17 79.7 kg (175 lb 12.8 oz)              We Performed the Following     DIABETES EDUCATION - Individual  []        Primary Care Provider Office Phone # Fax #    Bay Miller [phone]       [facility] [address] [facility] [address]  [facility] [address]        Equal Access to Services     OFELIA ALEXIS : Hadii aad ku hadasho Sobassam, waaxda luqadaha, qaybta kaalmada adeegyada, waxay massielin hayhima hassan . So [facility] [phone].    ATENCIÓN: Si habla español, tiene a kern disposición servicios gratuitos de asistencia lingüística. Zheng al [phone].    We comply with applicable federal civil rights laws and [place] laws. We do not discriminate on the basis of race, color, national origin, age, disability sex, sexual orientation or gender identity.            Thank you!     Thank you for choosing [facility]  for your care. Our goal is always to provide you with excellent care. Hearing back from our patients is one way we can continue to improve our services. Please take a few minutes to complete the written survey that you may receive in the mail after your visit with us. Thank you!             Your Updated Medication List - Protect others around you: Learn how to safely use, store and throw away your medicines at www.disposemymeds.org.          This list is accurate as of: 9/8/17 11:59 PM.  Always use your most recent med list.                   Brand Name Dispense Instructions  for use Diagnosis    acetaminophen 325 MG tablet    TYLENOL     Take 650 mg by mouth        * aspirin EC 81 MG EC tablet      Take 81 mg by mouth        * aspirin 81 MG tablet     60 tablet    Take 1 tablet (81 mg) by mouth daily    Encounter for routine adult health examination without abnormal findings       blood glucose monitoring test strip    no brand specified    100 strip    Use to test blood sugars twice daily or as directed    Type 2 diabetes mellitus without complication, without long-term current use of insulin (H)       capsaicin 0.025 % Crea cream    ZOSTRIX          cetirizine 10 MG tablet    zyrTEC    30 tablet    Take 1 tablet (10 mg) by mouth daily as needed for allergies    Allergic reaction, initial encounter       D 2000 2000 UNITS tablet   Generic drug:  cholecalciferol      Take 2,000 Units by mouth        DAILY VITES Tabs      TAKE 1 TABLET BY MOUTH EVERY DAY        gabapentin 100 MG capsule    NEURONTIN    90 capsule    Take 1 capsule (100 mg) by mouth At Bedtime    Type 2 diabetes mellitus without complication, without long-term current use of insulin (H)       hydrochlorothiazide 12.5 MG Tabs tablet     60 tablet    Take 2 tablets (25 mg) by mouth daily    Benign essential hypertension       ibuprofen 600 MG tablet    ADVIL/MOTRIN     Take 600 mg by mouth        losartan 100 MG tablet    COZAAR    90 tablet    Take 1 tablet (100 mg) by mouth daily    Benign essential hypertension       metFORMIN 500 MG 24 hr tablet    GLUCOPHAGE-XR    30 tablet    Take 1 tablet (500 mg) by mouth daily (with dinner)    Type 2 diabetes mellitus without complication, without long-term current use of insulin (H)       simvastatin 40 MG tablet    ZOCOR    30 tablet    Take 1 tablet (40 mg) by mouth At Bedtime    Hyperlipidemia, unspecified hyperlipidemia type       tamsulosin 0.4 MG capsule    FLOMAX     Take 0.4 mg by mouth        Vitamin-B Complex Tabs           * Notice:  This list has 2 medication(s) that  are the same as other medications prescribed for you. Read the directions carefully, and ask your doctor or other care provider to review them with you.

## 2017-09-08 NOTE — MR AVS SNAPSHOT
After Visit Summary   2017    Noemí Lassiter    MRN: [id number]           Patient Information     Date Of Birth          1950        Visit Information        Provider Department      2017 10:30 AM Rosalinda Ladd RD [facility] Diabetes        Today's Diagnoses     Diabetes mellitus without complication (H)    -  1       Follow-ups after your visit        Your next 10 appointments already scheduled     Sep 15, 2017 10:30 AM CDT   Programmable Hearing Aid Check with Alexis Blankenship [facility] Audiology ([email or web address] [place])    [address] [place] [id number] [phone]              Who to contact     Please call your clinic at [phone] to:    Ask questions about your health    Make or cancel appointments    Discuss your medicines    Learn about your test results    Speak to your doctor   If you have compliments or concerns about an experience at your clinic, or if you wish to file a complaint, please contact [facility] Patient Relations at [phone] or email us at [email or web address]         Additional Information About Your Visit        MyChart Information     itBit is an electronic gateway that provides easy, online access to your medical records. With itBit, you can request a clinic appointment, read your test results, renew a prescription or communicate with your care team.     To sign up for femeninast visit the website at [email or web address]   You will be asked to enter the access code listed below, as well as some personal information. Please follow the directions to create your username and password.     Your access code is: [email or web address]  Expires: 10/31/2017  6:30 AM     Your access code will  in 90 days. If you need help or a new code, please contact your [facility] or call [phone] for assistance.        Care EveryWhere ID     This is your  "Care EveryWhere ID. This could be used by other organizations to access your [place] medical records  [id number]        Your Vitals Were     Height BMI (Body Mass Index)                1.678 m (5' 6.06\") 27.98 kg/m2           Blood Pressure from Last 3 Encounters:   08/24/17 145/74   08/03/17 157/74   05/25/17 121/76    Weight from Last 3 Encounters:   09/08/17 78.8 kg (173 lb 11.2 oz)   08/24/17 79.7 kg (175 lb 12.8 oz)   08/03/17 77.6 kg (171 lb)              We Performed the Following     DIABETES EDUCATION - Individual  []        Primary Care Provider Office Phone # Fax #    Bay Miller [phone]       [facility] [address] [facility] [address]  [facility] [address]        Equal Access to Services     OFELIA ALEXIS : Hadii aad ku hadasho Sobassam, waaxda luqadaha, qaybta kaalmada adeolgayada, miriam hassan . So [facility] [phone].    ATENCIÓN: Si habla español, tiene a kern disposición servicios gratuitos de asistencia lingüística. Zheng al [phone].    We comply with applicable federal civil rights laws and [place] laws. We do not discriminate on the basis of race, color, national origin, age, disability sex, sexual orientation or gender identity.            Thank you!     Thank you for choosing [facility] DIABETES  for your care. Our goal is always to provide you with excellent care. Hearing back from our patients is one way we can continue to improve our services. Please take a few minutes to complete the written survey that you may receive in the mail after your visit with us. Thank you!             Your Updated Medication List - Protect others around you: Learn how to safely use, store and throw away your medicines at www.disposemymeds.org.          This list is accurate as of: 9/8/17 12:05 PM.  Always use your most recent med list.                   Brand Name Dispense Instructions for use Diagnosis    acetaminophen 325 MG tablet    TYLENOL     Take 650 mg " by mouth        * aspirin EC 81 MG EC tablet      Take 81 mg by mouth        * aspirin 81 MG tablet     60 tablet    Take 1 tablet (81 mg) by mouth daily    Encounter for routine adult health examination without abnormal findings       blood glucose monitoring test strip    no brand specified    100 strip    Use to test blood sugars twice daily or as directed    Type 2 diabetes mellitus without complication, without long-term current use of insulin (H)       capsaicin 0.025 % Crea cream    ZOSTRIX          cetirizine 10 MG tablet    zyrTEC    30 tablet    Take 1 tablet (10 mg) by mouth daily as needed for allergies    Allergic reaction, initial encounter       D 2000 2000 UNITS tablet   Generic drug:  cholecalciferol      Take 2,000 Units by mouth        DAILY VITES Tabs      TAKE 1 TABLET BY MOUTH EVERY DAY        gabapentin 100 MG capsule    NEURONTIN    90 capsule    Take 1 capsule (100 mg) by mouth At Bedtime    Type 2 diabetes mellitus without complication, without long-term current use of insulin (H)       hydrochlorothiazide 12.5 MG Tabs tablet     60 tablet    Take 2 tablets (25 mg) by mouth daily    Benign essential hypertension       ibuprofen 600 MG tablet    ADVIL/MOTRIN     Take 600 mg by mouth        losartan 100 MG tablet    COZAAR    90 tablet    Take 1 tablet (100 mg) by mouth daily    Benign essential hypertension       metFORMIN 500 MG 24 hr tablet    GLUCOPHAGE-XR    30 tablet    Take 1 tablet (500 mg) by mouth daily (with dinner)    Type 2 diabetes mellitus without complication, without long-term current use of insulin (H)       simvastatin 40 MG tablet    ZOCOR    30 tablet    Take 1 tablet (40 mg) by mouth At Bedtime    Hyperlipidemia, unspecified hyperlipidemia type       tamsulosin 0.4 MG capsule    FLOMAX     Take 0.4 mg by mouth        Vitamin-B Complex Tabs           * Notice:  This list has 2 medication(s) that are the same as other medications prescribed for you. Read the directions  carefully, and ask your doctor or other care provider to review them with you.

## 2017-09-08 NOTE — PROGRESS NOTES
"  Diabetes Self Management Training: Follow-up Visit    Noemí Lassiter presents today for education and evaluation of glucose control related to Type 2 diabetes.    He is accompanied by     Patient Problem List and Family Medical History reviewed for relevant medical history, current medical status, and diabetes risk factors.    Current Diabetes Management per Patient:  Taking diabetes medications?   yes:     Diabetes Medication(s)     Biguanides Sig    metFORMIN (GLUCOPHAGE-XR) 500 MG 24 hr tablet Take 1 tablet (500 mg) by mouth daily (with dinner)          Past Diabetes Education: Newly diagnosed    Patient glucose self monitoring as follows: 1-2 times daily.   BG results: fasting glucose- 117-179 and pre-supper glucose- 99, 110, 134, 156, 240     Vitals:  Ht 1.678 m (5' 6.06\")  Wt 78.8 kg (173 lb 11.2 oz)  BMI 27.98 kg/m2  Estimated body mass index is 27.98 kg/(m^2) as calculated from the following:    Height as of this encounter: 1.678 m (5' 6.06\").    Weight as of this encounter: 78.8 kg (173 lb 11.2 oz).   Last 3 BP:   BP Readings from Last 3 Encounters:   08/24/17 145/74   08/03/17 157/74   05/25/17 121/76     History   Smoking Status     Never Smoker   Smokeless Tobacco     Never Used       Labs:  Lab Results   Component Value Date    A1C 7.0 08/03/2017     Lab Results   Component Value Date    GLC 93 07/05/2017     Lab Results   Component Value Date     05/04/2017     HDL Cholesterol   Date Value Ref Range Status   05/04/2017 51 >39 mg/dL Final   ]  GFR Estimate   Date Value Ref Range Status   05/25/2017 >90  Non  GFR Calc   >60 mL/min/1.7m2 Final     GFR Estimate If Black   Date Value Ref Range Status   05/25/2017 >90   GFR Calc   >60 mL/min/1.7m2 Final     Lab Results   Component Value Date    CR 0.81 05/25/2017     No results found for: MICROALBUMIN    Nutrition Review:  Noemí returns for f/u visit with the [facility] intepreter. He also saw Christianne Wu " PRIYANKA prior to this visit today. He tells us that he does not consistently take his Metformin at night as he thinks he is low sometimes. This was discussed with Christianne Montgomery who will discuss with his PCP as it may be postural hypotension. He continues to attend adult  4 days/wk from 8A-2P and has has a home health aide who comes 2x/wk for 4 hours and does some cooking for him. He eats primarily Finnish traditional foods.    Diet Recall:   Breakfast:(9AM) :2 injera with oil (less than previous) or beans with decreased oil, tea with artificial sweetener or Home 1 jarret with peanut butter and tea with 1 tsp sugar  AM snack:nothing  Lunch:(1-2 PM) Center: chicken/rice/spaghetti/stew/pocket bread/banana, water or Home: Suqaar (meat/veg stew), rice/veggies/salad, water  PM snack:nothing  Dinner:(8-9PM) jarret with stew, salad      Physical Activity:    Walks as able      Reviewed diet and bg today. Reinforced importance of taking the metformin consistently and that it will not cause his bg to go low. Reviewed bg goals:  fasting and less than 180 2 hours after meals. Instructed Noemí to test his 2 hour after meal bg after his largest meal of the day which is his mid-day meal around 1-2 PM, as his evening meal is smaller. Discussed the current dose of metformin as being small or a starting dose and that it is very normal for people to need more to assist with good bg control. Reviewed significance of exercise and reducing portions of food consumed as additional strategies to assist with good bg control. Provided him with another [place]er carb counting/nutrition guide.     Education provided today on:  AADE Self-Care Behaviors:  Healthy Eating: weight reduction, heart healthy diet and portion control  Being Active: relationship to blood glucose  Taking Medication: when to take medications    Pt verbalized understanding of concepts discussed and recommendations provided today.      ASSESSMENT: Noemí's FBS are  mostly above target range however he has not been consistently taking his metformin. Verbalized his intent to take it daily so we can reassess his bg control to determine if a higher dose of metformin is needed. His weight has remained stable, emphasis on slightly reducing portions today.       PLAN:  Take metformin daily with evening meal  Test FBS and 2 hours after midday meal  Reduce portions  Be as active as possible  AVS printed and provided to patient today.    FOLLOW-UP:  One month    Time Spent: 60 minutes  Encounter Type: Individual    Any diabetes medication dose changes were made via the CDE Protocol and Collaborative Practice Agreement with the patient's referring provider. A copy of this encounter was shared with the provider.

## 2017-09-14 VITALS — WEIGHT: 173.7 LBS | BODY MASS INDEX: 27.98 KG/M2

## 2017-09-14 NOTE — PROGRESS NOTES
DIABETES EDUCATION NOTE:    Noemí Lassiter presents today for education related to Type 2 diabetes    Patient is being treated with:  oral agents  He is accompanied by self and     PATIENT CONCERNS RELATED TO DIABETES SELF MANAGEMENT:     Noemí has been diagnosed with type 2 diabetes some number of years ago and is currently prescribed Metformin  mg daily  He states that he does not take his Metformin if his BG is under 140 before he is supposed to take it.    He complains of feeling dizzy and believes that this is related to his diabetes.      ASSESSMENT:    Current Diabetes Management per Patient:  Taking diabetes medications?   yes:     Diabetes Medication(s)     Biguanides Sig    metFORMIN (GLUCOPHAGE-XR) 500 MG 24 hr tablet Take 1 tablet (500 mg) by mouth daily (with dinner)        Monitoring  Patient glucose self monitoring as follows: 1-2 times daily.   BG meter: Contour meter  BG results: fasting glucose- 122-179 and pre-supper glucose-  (one value of 240 which was after he had eaten a couple of figs because he thought his blood sugar was low).      BG values are: In goal  Patient's most recent   Lab Results   Component Value Date    A1C 7.0 08/03/2017    is meeting goal of <7.0    Exercise: no regular exercise program    Nutrition:   Patient currently eats 2 meals 1 snacks per day.  Diet is fairly typical Indian diet.  There is flatbread at each meal and some meat and vegetables.  He states that he doesn't drink sugared beverages.     Hypoglycemia:   Patient is at risk of hypoglycemia? No                            EDUCATION and INSTRUCTION PROVIDED AT THIS VISIT:      Much of the discussion today centered around this complaint of dizziness that he has.  Noted that his medication compliance is somewhat sporadic, and he history of hypertension.  He is associating his dizziness with his blood glucose, but he is not taking any medications that would cause hypoglycemia.  He is also  eating whenever he feels this dizziness which appears to just elevate his blood sugars.  He states that the dizziness is most pronounced when he is getting up from his prayers and when he is getting out of bed in the morning, all of which would lend itself to a postural hypotension.      He did not have a good understanding of how his metformin works, and has been with holding the medication whenever he sees that his BG is under 120.  Explanation given via  about how metformin works, and reassured that he does not need to eat when he is feeling dizziness as it is highly unlikely that it is related to his blood glucose going low.  Instead encouraged to actually check his BG when he feels this way so that he can feel reassured.  Reinforced what actually constitutes low blood glucose (<70), and reinforced that he does not need to eat to raise his blood sugar if it is higher than 70.      Also advised him to discuss this issue of dizziness with his primary care provider.  In the mean time suggested that he work on moving to an upright position more slowly to give himself some time to acclimate.      Suggested that he start alternating the times that he checks his BG from fasting to two hours after a his largest meal of the day so he can start seeing how food affects his BG.   Encouraged him to get into a habit of walking daily or using his exercise bike when the weather is bad.      Patient-stated goal written and given to Noemí Lassiter.  Verbalized and demonstrated understanding of instructions.     PLAN:  See patient instructions  AVS printed and given to patient    FOLLOW-UP:        Time spent with patient at today's visit was 60 minutes.      Any diabetes medication dose changes were made via the CDE Protocol and Collaborative Practice Agreement with [place] and  [facility].  A copy of this encounter was provided to patient's referring provider.

## 2017-09-15 ENCOUNTER — OFFICE VISIT (OUTPATIENT)
Dept: AUDIOLOGY | Facility: CLINIC | Age: 67
End: 2017-09-15

## 2017-09-15 DIAGNOSIS — H90.3 SENSORINEURAL HEARING LOSS, BILATERAL: Primary | ICD-10-CM

## 2017-09-15 NOTE — PROGRESS NOTES
AUDIOLOGY REPORT    BACKGROUND INFORMATION: Noemí Lassiter was seen in the Audiology Clinic at the [facility] on 9/15/2017 for follow-up.  The patient has been seen previously at an outside clinic [facility]) and results revealed a bilateral sensorineural hearing loss.  The patient was fit with binaural Unitron Stride 700 half-shell hearing aids on 4/26/17.  He reports that he lost his right hearing aid about 10 days after having them and so has not been wearing the left one since he lost the right one. He reports that the hearing aids are too loud and too loose in the ears.  He returns to  the new right hearing aid today as well as make programming adjustments.  He was accompanied to today's appointment by a [facility] .    TEST RESULTS AND PROCEDURES: The replacement right hearing aid was placed and it was a good fit, no feedback was noted.  It was discussed with Noemí that the hearing aids will never feels as snug as expanding foam plugs.  The hearing aids did not easily come out of the ears when the removal cord was tugged on.    An electroacoustic check revealed that both hearing aids were functioning well, no distortion or weakness was noted.  A listening check revealed that the hearing aids sounded crisp and clear.  The hearing aids were cleaned and were connected to the computer for programming changes.  Adjustments were made including lowering the volume of the right hearing aid to 70% and the left hearing aid to 80%, both are set to auto-acclimatize to 100% over the next few months.  The patient reported that the right hearing aid seemed louder than the left hearing aid.  It was reviewed with him that it will take his brain 2-3 weeks of full time use to adapt to how things sound with the hearing aids. He expressed understanding.    He had questions about changing the style of the hearing aids.  It was reviewed that he is now past the  45 day trial period and so the hearing aid style cannot be changed unless there is a need for the change.  He expressed understanding.       SUMMARY AND RECOMMENDATIONS: The replacement right hearing aid was fit today.  Adjustments were made as noted above and the patient will return for follow-up on 11/3/2017 as he requested another appointment to additional changes if he does not adapt to the sound of the hearing aids.  Call this clinic with questions regarding today s visit.      Alexis Garrett  Audiologist  [place] License  #[id number]

## 2017-09-15 NOTE — MR AVS SNAPSHOT
After Visit Summary   9/15/2017    Noemí Lassiter    MRN: [id number]           Patient Information     Date Of Birth          1950        Visit Information        Provider Department      9/15/2017 10:15 AM Sanjuana Sifuentes AuD; [place] [employer] [facility] Audiology         Follow-ups after your visit        Your next 10 appointments already scheduled     Oct 06, 2017  9:30 AM CDT   (Arrive by 9:15 AM)   Office Visit with Rosalinda Ladd RD   [facility] Diabetes ([facility])    [address] [place] [id number] [phone]           Bring a current list of meds and any records pertaining to this visit. For Physicals, please bring immunization records and any forms needing to be filled out. Please arrive 10 minutes early to complete paperwork.            Oct 06, 2017 10:30 AM CDT   (Arrive by 10:15 AM)   Office Visit with Christianne Wu RN   [facility] ([facility])    [address] [place] [id number] [phone]           Bring a current list of meds and any records pertaining to this visit. For Physicals, please bring immunization records and any forms needing to be filled out. Please arrive 10 minutes early to complete paperwork.            Nov 03, 2017  2:00 PM CDT   Programmable Hearing Aid Check with Alexis Blankenship   [facility] Audiology ([facility])    [address] [place] [id number] [phone]              Who to contact     Please call your clinic at [phone] to:    Ask questions about your health    Make or cancel appointments    Discuss your medicines    Learn about your test results    Speak to your doctor   If you have compliments or concerns about an experience at your clinic, or if you wish to file a complaint, please contact [facility] Patient Relations at [phone]  or email us at [email or web address]         Additional Information About Your Visit        Light-Based Technologieshart Information     [employer] Studiost is an electronic gateway that provides easy, online access to your medical records. With [employer], you can request a clinic appointment, read your test results, renew a prescription or communicate with your care team.     To sign up for [employer] Software visit the website at [email or web address]   You will be asked to enter the access code listed below, as well as some personal information. Please follow the directions to create your username and password.     Your access code is: [email or web address]  Expires: 10/31/2017  6:30 AM     Your access code will  in 90 days. If you need help or a new code, please contact your [facility] or call [phone] for assistance.        Care EveryWhere ID     This is your Care EveryWhere ID. This could be used by other organizations to access your [place] medical records  [id number]         Blood Pressure from Last 3 Encounters:   17 145/74   17 157/74   17 121/76    Weight from Last 3 Encounters:   17 78.8 kg (173 lb 11.2 oz)   17 78.8 kg (173 lb 11.2 oz)   17 79.7 kg (175 lb 12.8 oz)              Today, you had the following     No orders found for display       Primary Care Provider Office Phone # Fax #    Bay Miller [phone]       [address]  [facility] [address]        Equal Access to Services     YONATHAN ALEXIS : Hadii aad ku hadasho Soomaali, waaxda luqadaha, qaybta kaalmada adeegyada, miriam valladares. So [facility] [phone].    ATENCIÓN: Si habla español, tiene a kern disposición servicios gratuitos de asistencia lingüística. Llame al [phone].    We comply with applicable federal civil rights laws and [place] laws. We do not discriminate on the basis of race, color, national origin, age,  disability sex, sexual orientation or gender identity.            Thank you!     Thank you for choosing [facility] AUDIOLOGY  for your care. Our goal is always to provide you with excellent care. Hearing back from our patients is one way we can continue to improve our services. Please take a few minutes to complete the written survey that you may receive in the mail after your visit with us. Thank you!             Your Updated Medication List - Protect others around you: Learn how to safely use, store and throw away your medicines at www.disposemymeds.org.          This list is accurate as of: 9/15/17 11:15 AM.  Always use your most recent med list.                   Brand Name Dispense Instructions for use Diagnosis    acetaminophen 325 MG tablet    TYLENOL     Take 650 mg by mouth        * aspirin EC 81 MG EC tablet      Take 81 mg by mouth        * aspirin 81 MG tablet     60 tablet    Take 1 tablet (81 mg) by mouth daily    Encounter for routine adult health examination without abnormal findings       blood glucose monitoring test strip    no brand specified    100 strip    Use to test blood sugars twice daily or as directed    Type 2 diabetes mellitus without complication, without long-term current use of insulin (H)       capsaicin 0.025 % Crea cream    ZOSTRIX          cetirizine 10 MG tablet    zyrTEC    30 tablet    Take 1 tablet (10 mg) by mouth daily as needed for allergies    Allergic reaction, initial encounter       D 2000 2000 UNITS tablet   Generic drug:  cholecalciferol      Take 2,000 Units by mouth        DAILY VITES Tabs      TAKE 1 TABLET BY MOUTH EVERY DAY        gabapentin 100 MG capsule    NEURONTIN    90 capsule    Take 1 capsule (100 mg) by mouth At Bedtime    Type 2 diabetes mellitus without complication, without long-term current use of insulin (H)       hydrochlorothiazide 12.5 MG Tabs tablet     60 tablet    Take 2 tablets (25 mg) by mouth daily    Benign essential hypertension        ibuprofen 600 MG tablet    ADVIL/MOTRIN     Take 600 mg by mouth        losartan 100 MG tablet    COZAAR    90 tablet    Take 1 tablet (100 mg) by mouth daily    Benign essential hypertension       metFORMIN 500 MG 24 hr tablet    GLUCOPHAGE-XR    30 tablet    Take 1 tablet (500 mg) by mouth daily (with dinner)    Type 2 diabetes mellitus without complication, without long-term current use of insulin (H)       simvastatin 40 MG tablet    ZOCOR    30 tablet    Take 1 tablet (40 mg) by mouth At Bedtime    Hyperlipidemia, unspecified hyperlipidemia type       tamsulosin 0.4 MG capsule    FLOMAX     Take 0.4 mg by mouth        Vitamin-B Complex Tabs           * Notice:  This list has 2 medication(s) that are the same as other medications prescribed for you. Read the directions carefully, and ask your doctor or other care provider to review them with you.

## 2017-10-06 ENCOUNTER — OFFICE VISIT (OUTPATIENT)
Dept: EDUCATION SERVICES | Facility: CLINIC | Age: 67
End: 2017-10-06

## 2017-10-06 VITALS — BODY MASS INDEX: 28.28 KG/M2 | HEIGHT: 66 IN | WEIGHT: 176 LBS

## 2017-10-06 DIAGNOSIS — E11.9 TYPE 2 DIABETES MELLITUS WITHOUT COMPLICATION, WITHOUT LONG-TERM CURRENT USE OF INSULIN (H): Primary | ICD-10-CM

## 2017-10-06 DIAGNOSIS — E11.9 DIABETES MELLITUS WITHOUT COMPLICATION (H): Primary | ICD-10-CM

## 2017-10-06 DIAGNOSIS — I10 BENIGN ESSENTIAL HYPERTENSION: ICD-10-CM

## 2017-10-06 RX ORDER — PEN NEEDLE, DIABETIC 31 GX5/16"
1 NEEDLE, DISPOSABLE MISCELLANEOUS 2 TIMES DAILY
Qty: 100 EACH | Refills: 3 | Status: SHIPPED | OUTPATIENT
Start: 2017-10-06

## 2017-10-06 RX ORDER — HYDROCHLOROTHIAZIDE 12.5 MG/1
25 TABLET ORAL DAILY
Qty: 180 TABLET | Refills: 3 | Status: SHIPPED | OUTPATIENT
Start: 2017-10-06 | End: 2022-11-18

## 2017-10-06 NOTE — MR AVS SNAPSHOT
After Visit Summary   10/6/2017    Noemí Lassiter    MRN: [id number]           Patient Information     Date Of Birth          1950        Visit Information        Provider Department      10/6/2017 10:15 AM Christianne Wu, RN; LANGUAGE [facility] Diabetes        Today's Diagnoses     Type 2 diabetes mellitus without complication, without long-term current use of insulin (H)    -  1       Follow-ups after your visit        Your next 10 appointments already scheduled     Nov 03, 2017  2:00 PM CDT   Programmable Hearing Aid Check with Alexis Blankenship   [facility] Audiology ([facility])    [address] [place] [id number] [phone]            Jan 12, 2018  9:30 AM CST   (Arrive by 9:15 AM)   Office Visit with Rosalinda Ladd RD   [facility] Diabetes ([facility])    [address] [place] [address] [id number] [phone]           Bring a current list of meds and any records pertaining to this visit. For Physicals, please bring immunization records and any forms needing to be filled out. Please arrive 10 minutes early to complete paperwork.            Jan 12, 2018 10:30 AM CST   (Arrive by 10:15 AM)   Office Visit with Christianne Wu RN   [facility] Diabetes ([facility])    [address] [place] [id number] [phone]           Bring a current list of meds and any records pertaining to this visit. For Physicals, please bring immunization records and any forms needing to be filled out. Please arrive 10 minutes early to complete paperwork.              Who to contact     Please call your clinic at [phone] to:    Ask questions about your health    Make or cancel appointments    Discuss your medicines    Learn about your test results    Speak to your doctor   If you have compliments or concerns about an experience at your clinic, or if you wish to  file a complaint, please contact [facility] Patient Relations at [phone] or email us at [email or web address]         Additional Information About Your Visit        JoyhoundharCitizenShipper Information     Mobile Theory is an electronic gateway that provides easy, online access to your medical records. With Mobile Theory, you can request a clinic appointment, read your test results, renew a prescription or communicate with your care team.     To sign up for Mobile Theory visit the website at [email or web address]   You will be asked to enter the access code listed below, as well as some personal information. Please follow the directions to create your username and password.     Your access code is: [email or web address]  Expires: 10/31/2017  6:30 AM     Your access code will  in 90 days. If you need help or a new code, please contact your [facility] or call [phone] for assistance.        Care EveryWhere ID     This is your Care EveryWhere ID. This could be used by other organizations to access your [place] medical records  [id number]         Blood Pressure from Last 3 Encounters:   10/06/17 160/76   17 145/74   17 157/74    Weight from Last 3 Encounters:   10/06/17 79.8 kg (176 lb)   17 78.8 kg (173 lb 11.2 oz)   17 78.8 kg (173 lb 11.2 oz)              We Performed the Following     DIABETES EDUCATION - Individual  []          Today's Medication Changes          These changes are accurate as of: 10/6/17 11:59 PM.  If you have any questions, ask your nurse or doctor.               Start taking these medicines.        Dose/Directions    Alcohol Prep Pads   Used for:  Type 2 diabetes mellitus without complication, without long-term current use of insulin (H)   Started by:  Bay Miller MD        Dose:  1 each   1 each 2 times daily   Quantity:  100 each   Refills:  3       blood glucose monitoring test strip   Commonly known as:  MANUEL  CONTOUR NEXT   Used for:  Type 2 diabetes mellitus without complication, without long-term current use of insulin (H)   Started by:  Christianne Wu, RN        Use to test blood sugar 2 times daily or as directed.  Ok to substitute alternative if insurance prefers.   Quantity:  100 strip   Refills:  prn            Where to get your medicines      These medications were sent to [facility] [place], [place] - [address] [facility]  [address] [facility] [address]     Phone:  [phone]     Alcohol Prep Pads    blood glucose monitoring test strip    hydrochlorothiazide 12.5 MG Tabs tablet                Primary Care Provider Office Phone # Fax #    Bay Miller [phone]       [address]  [facility] [address]        Equal Access to Services     OFELIA ALEXIS : Hadii catrachito horta hadasho Soomaali, waaxda luqadaha, qaybta kaalmada adeegyada, miriam valladares. So [facility] [phone].    ATENCIÓN: Si habla español, tiene a kern disposición servicios gratuitos de asistencia lingüística. Llame al [phone].    We comply with applicable federal civil rights laws and [place] laws. We do not discriminate on the basis of race, color, national origin, age, disability, sex, sexual orientation, or gender identity.            Thank you!     Thank you for choosing [facility] DIABETES  for your care. Our goal is always to provide you with excellent care. Hearing back from our patients is one way we can continue to improve our services. Please take a few minutes to complete the written survey that you may receive in the mail after your visit with us. Thank you!             Your Updated Medication List - Protect others around you: Learn how to safely use, store and throw away your medicines at www.disposemymeds.org.          This list is accurate as of: 10/6/17 11:59 PM.  Always use your most recent med list.                   Brand Name  Dispense Instructions for use Diagnosis    acetaminophen 325 MG tablet    TYLENOL     Take 650 mg by mouth        Alcohol Prep Pads     100 each    1 each 2 times daily    Type 2 diabetes mellitus without complication, without long-term current use of insulin (H)       * aspirin EC 81 MG EC tablet      Take 81 mg by mouth        * aspirin 81 MG tablet     60 tablet    Take 1 tablet (81 mg) by mouth daily    Encounter for routine adult health examination without abnormal findings       blood glucose monitoring test strip    MANUEL CONTOUR NEXT    100 strip    Use to test blood sugar 2 times daily or as directed.  Ok to substitute alternative if insurance prefers.    Type 2 diabetes mellitus without complication, without long-term current use of insulin (H)       capsaicin 0.025 % Crea cream    ZOSTRIX          cetirizine 10 MG tablet    zyrTEC    30 tablet    Take 1 tablet (10 mg) by mouth daily as needed for allergies    Allergic reaction, initial encounter       D 2000 2000 UNITS tablet   Generic drug:  cholecalciferol      Take 2,000 Units by mouth        DAILY VITES Tabs      TAKE 1 TABLET BY MOUTH EVERY DAY        gabapentin 100 MG capsule    NEURONTIN    90 capsule    Take 1 capsule (100 mg) by mouth At Bedtime    Type 2 diabetes mellitus without complication, without long-term current use of insulin (H)       hydrochlorothiazide 12.5 MG Tabs tablet     180 tablet    Take 2 tablets (25 mg) by mouth daily    Benign essential hypertension       ibuprofen 600 MG tablet    ADVIL/MOTRIN     Take 600 mg by mouth        losartan 100 MG tablet    COZAAR    90 tablet    Take 1 tablet (100 mg) by mouth daily    Benign essential hypertension       metFORMIN 500 MG 24 hr tablet    GLUCOPHAGE-XR    30 tablet    Take 1 tablet (500 mg) by mouth daily (with dinner)    Type 2 diabetes mellitus without complication, without long-term current use of insulin (H)       simvastatin 40 MG tablet    ZOCOR    30 tablet    Take 1 tablet  (40 mg) by mouth At Bedtime    Hyperlipidemia, unspecified hyperlipidemia type       tamsulosin 0.4 MG capsule    FLOMAX     Take 0.4 mg by mouth        Vitamin-B Complex Tabs           * Notice:  This list has 2 medication(s) that are the same as other medications prescribed for you. Read the directions carefully, and ask your doctor or other care provider to review them with you.

## 2017-10-06 NOTE — MR AVS SNAPSHOT
After Visit Summary   10/6/2017    Noemí Lassiter    MRN: [id number]           Patient Information     Date Of Birth          1950        Visit Information        Provider Department      10/6/2017 9:15 AM Rosalinda Ladd RD; [facility]        Today's Diagnoses     Diabetes mellitus without complication (H)    -  1       Follow-ups after your visit        Your next 10 appointments already scheduled     Nov 03, 2017  2:00 PM CDT   Programmable Hearing Aid Check with Alexis Blankenship   [facility] Audiology ([facility])    [address] [place] [address] [phone] [phone]            Jan 12, 2018  9:30 AM CST   (Arrive by 9:15 AM)   Office Visit with Rosalinda Ladd RD   [facility] Diabetes ([facility])    [address] [place] [address] [id number] [phone]           Bring a current list of meds and any records pertaining to this visit. For Physicals, please bring immunization records and any forms needing to be filled out. Please arrive 10 minutes early to complete paperwork.            Jan 12, 2018 10:30 AM CST   (Arrive by 10:15 AM)   Office Visit with Christianne Wu RN   [facility] Diabetes ([facility])    [address] [place] [id number] [phone]           Bring a current list of meds and any records pertaining to this visit. For Physicals, please bring immunization records and any forms needing to be filled out. Please arrive 10 minutes early to complete paperwork.              Who to contact     Please call your clinic at [phone] to:    Ask questions about your health    Make or cancel appointments    Discuss your medicines    Learn about your test results    Speak to your doctor   If you have compliments or concerns about an experience at your clinic, or if you wish to file a complaint, please contact [facility]  "[place] [facility] Patient Relations at [phone] or email us at [email or web address]         Additional Information About Your Visit        crobohart Information     Showpitch is an electronic gateway that provides easy, online access to your medical records. With Showpitch, you can request a clinic appointment, read your test results, renew a prescription or communicate with your care team.     To sign up for Showpitch visit the website at [email or web address]   You will be asked to enter the access code listed below, as well as some personal information. Please follow the directions to create your username and password.     Your access code is: [email or web address]  Expires: 10/31/2017  6:30 AM     Your access code will  in 90 days. If you need help or a new code, please contact your [facility] or call [phone] for assistance.        Care EveryWhere ID     This is your Care EveryWhere ID. This could be used by other organizations to access your [place] medical records  [id number]        Your Vitals Were     Height BMI (Body Mass Index)                1.678 m (5' 6.06\") 28.35 kg/m2           Blood Pressure from Last 3 Encounters:   17 145/74   17 157/74   17 121/76    Weight from Last 3 Encounters:   10/06/17 79.8 kg (176 lb)   17 78.8 kg (173 lb 11.2 oz)   17 78.8 kg (173 lb 11.2 oz)              We Performed the Following     DIABETES EDUCATION - Individual  []          Today's Medication Changes          These changes are accurate as of: 10/6/17 12:09 PM.  If you have any questions, ask your nurse or doctor.               Start taking these medicines.        Dose/Directions    Alcohol Prep Pads   Used for:  Type 2 diabetes mellitus without complication, without long-term current use of insulin (H)   Started by:  Bay Miller MD        Dose:  1 each   1 each 2 times daily   Quantity:  100 each   Refills:  3         These " medicines have changed or have updated prescriptions.        Dose/Directions    * blood glucose monitoring test strip   Commonly known as:  no brand specified   This may have changed:  Another medication with the same name was added. Make sure you understand how and when to take each.   Used for:  Type 2 diabetes mellitus without complication, without long-term current use of insulin (H)   Changed by:  Bay Miller MD        Use to test blood sugars twice daily or as directed   Quantity:  100 strip   Refills:  3       * blood glucose monitoring test strip   Commonly known as:  MANUEL CONTOUR NEXT   This may have changed:  You were already taking a medication with the same name, and this prescription was added. Make sure you understand how and when to take each.   Used for:  Type 2 diabetes mellitus without complication, without long-term current use of insulin (H)   Changed by:  Christianne Wu, RN        Use to test blood sugar 2 times daily or as directed.  Ok to substitute alternative if insurance prefers.   Quantity:  100 strip   Refills:  prn       * Notice:  This list has 2 medication(s) that are the same as other medications prescribed for you. Read the directions carefully, and ask your doctor or other care provider to review them with you.         Where to get your medicines      These medications were sent to [facility] [place] [address] [facility]  [address] [facility] [address]     Phone:  [phone]     Alcohol Prep Pads    blood glucose monitoring test strip    hydrochlorothiazide 12.5 MG Tabs tablet                Primary Care Provider Office Phone # Fax #    Bay MD Angela [phone]       [address]  [facility] [address]        Equal Access to Services     [facility] VANESA AH: Chon hewitto Sobassam, waaxda luqadaha, qaybta kaalmada adeegyamikel, miriam valladares. So [facility]  [phone].    ATENCIÓN: Si jacques urias, tiene a kern disposición servicios gratuitos de asistencia lingüística. Zheng patel [phone].    We comply with applicable federal civil rights laws and [place] laws. We do not discriminate on the basis of race, color, national origin, age, disability, sex, sexual orientation, or gender identity.            Thank you!     Thank you for choosing [facility] DIABETES  for your care. Our goal is always to provide you with excellent care. Hearing back from our patients is one way we can continue to improve our services. Please take a few minutes to complete the written survey that you may receive in the mail after your visit with us. Thank you!             Your Updated Medication List - Protect others around you: Learn how to safely use, store and throw away your medicines at www.disposemymeds.org.          This list is accurate as of: 10/6/17 12:09 PM.  Always use your most recent med list.                   Brand Name Dispense Instructions for use Diagnosis    acetaminophen 325 MG tablet    TYLENOL     Take 650 mg by mouth        Alcohol Prep Pads     100 each    1 each 2 times daily    Type 2 diabetes mellitus without complication, without long-term current use of insulin (H)       * aspirin EC 81 MG EC tablet      Take 81 mg by mouth        * aspirin 81 MG tablet     60 tablet    Take 1 tablet (81 mg) by mouth daily    Encounter for routine adult health examination without abnormal findings       * blood glucose monitoring test strip    no brand specified    100 strip    Use to test blood sugars twice daily or as directed    Type 2 diabetes mellitus without complication, without long-term current use of insulin (H)       * blood glucose monitoring test strip    MANUEL CONTOUR NEXT    100 strip    Use to test blood sugar 2 times daily or as directed.  Ok to substitute alternative if insurance prefers.    Type 2 diabetes mellitus without complication, without long-term current  use of insulin (H)       capsaicin 0.025 % Crea cream    ZOSTRIX          cetirizine 10 MG tablet    zyrTEC    30 tablet    Take 1 tablet (10 mg) by mouth daily as needed for allergies    Allergic reaction, initial encounter       D 2000 2000 UNITS tablet   Generic drug:  cholecalciferol      Take 2,000 Units by mouth        DAILY VITES Tabs      TAKE 1 TABLET BY MOUTH EVERY DAY        gabapentin 100 MG capsule    NEURONTIN    90 capsule    Take 1 capsule (100 mg) by mouth At Bedtime    Type 2 diabetes mellitus without complication, without long-term current use of insulin (H)       hydrochlorothiazide 12.5 MG Tabs tablet     180 tablet    Take 2 tablets (25 mg) by mouth daily    Benign essential hypertension       ibuprofen 600 MG tablet    ADVIL/MOTRIN     Take 600 mg by mouth        losartan 100 MG tablet    COZAAR    90 tablet    Take 1 tablet (100 mg) by mouth daily    Benign essential hypertension       metFORMIN 500 MG 24 hr tablet    GLUCOPHAGE-XR    30 tablet    Take 1 tablet (500 mg) by mouth daily (with dinner)    Type 2 diabetes mellitus without complication, without long-term current use of insulin (H)       simvastatin 40 MG tablet    ZOCOR    30 tablet    Take 1 tablet (40 mg) by mouth At Bedtime    Hyperlipidemia, unspecified hyperlipidemia type       tamsulosin 0.4 MG capsule    FLOMAX     Take 0.4 mg by mouth        Vitamin-B Complex Tabs           * Notice:  This list has 4 medication(s) that are the same as other medications prescribed for you. Read the directions carefully, and ask your doctor or other care provider to review them with you.

## 2017-10-06 NOTE — PROGRESS NOTES
"  Diabetes Self Management Training: Follow-up Visit    Noemí Lassiter presents today for education related to Type 2 diabetes.    He is accompanied by     Patient Problem List and Family Medical History reviewed for relevant medical history, current medical status, and diabetes risk factors.    Current Diabetes Management per Patient:  Taking diabetes medications?   yes:     Diabetes Medication(s)     Biguanides Sig    metFORMIN (GLUCOPHAGE-XR) 500 MG 24 hr tablet Take 1 tablet (500 mg) by mouth daily (with dinner)          Past Diabetes Education: Yes    Patient glucose self monitoring as follows: 1-2 times daily.   BG results: fasting glucose- 111, 128, 120, 110, 103, 102 and pre-supper glucose- 120, 160, 116, 90, 136, 102     Vitals:  Ht 1.678 m (5' 6.06\")  Wt 79.8 kg (176 lb)  BMI 28.35 kg/m2  Estimated body mass index is 28.35 kg/(m^2) as calculated from the following:    Height as of this encounter: 1.678 m (5' 6.06\").    Weight as of this encounter: 79.8 kg (176 lb).   Last 3 BP:   BP Readings from Last 3 Encounters:   08/24/17 145/74   08/03/17 157/74   05/25/17 121/76     History   Smoking Status     Never Smoker   Smokeless Tobacco     Never Used       Labs:  Lab Results   Component Value Date    A1C 7.0 08/03/2017     Lab Results   Component Value Date    GLC 93 07/05/2017     Lab Results   Component Value Date     05/04/2017     HDL Cholesterol   Date Value Ref Range Status   05/04/2017 51 >39 mg/dL Final   ]  GFR Estimate   Date Value Ref Range Status   05/25/2017 >90  Non  GFR Calc   >60 mL/min/1.7m2 Final     GFR Estimate If Black   Date Value Ref Range Status   05/25/2017 >90   GFR Calc   >60 mL/min/1.7m2 Final     Lab Results   Component Value Date    CR 0.81 05/25/2017     No results found for: MICROALBUMIN    Nutrition Review:  Noemí returns with the [facility]  today for follow up diabetes education. He brings his bg meter for review, " he is checking his bg 1-2x/day and is taking all of his metformin as prescribed. HE continues to attend adult  4 days/wk and he tells me he is exercising while there by walking and using the elipitical. HE tells me he has a stationary bike at home and is also using that. He is exercising 2 x/day most days of the week. He tells me he feels much better since he has been exercising.     Diet Recall:   Breakfast:2 injera/tea with artificial sweetener or ambulo with a little bit of sugar and oil or jarret with pb  AM snack:nothing  Lunch:rice/spaghetti/vegetable/fish/chicken   PM snack:tea with artificial sweetener  Dinner:like lunch or sometimes unsweetened cereal/2% milk or jarret/veggies  Evening snack:nothing      Gave Noemí lots of positive feedback for making above lifestyle changes and consistently taking his metformin resulting in improved bg control. Discussed importance of good diabetes control including bg/bp/lipids with diet, exercise and medication to keep him healthy. Basic ed complete today.       Education provided today on:  AADE Self-Care Behaviors:  Healthy Eating: heart healthy diet and portion control  Being Active: relationship to blood glucose and describe appropriate activity program  Monitoring: proper technique and individual blood glucose targets  Taking Medication: when to take medications    Pt verbalized understanding of concepts discussed and recommendations provided today.         ASSESSMENT: All bg are now WNL with metformin adherence, diet and regular exercise. Noemí is motivated to take care of his diabetes. Basic ed complete.       PLAN:  Continue with diet, exercise and metformin, and SMBG  Basic ed complete  AVS printed and provided to patient today.    FOLLOW-UP:  Follow-up as needed.   Chart routed to referring provider.    Time Spent: 30 minutes  Encounter Type: Individual    Any diabetes medication dose changes were made via the CDE Protocol and Collaborative Practice  Agreement with the patient's referring provider. A copy of this encounter was shared with the provider.

## 2017-10-09 VITALS — SYSTOLIC BLOOD PRESSURE: 160 MMHG | DIASTOLIC BLOOD PRESSURE: 76 MMHG

## 2017-10-09 NOTE — PROGRESS NOTES
DIABETES EDUCATION NOTE:    Noemí Hobson attends today for education related to Type 2 diabetes    Patient is being treated with:  oral agents, diet and exercise  He is accompanied by self and [place] citizen     PATIENT CONCERNS RELATED TO DIABETES SELF MANAGEMENT:     He is here for follow up of previous diagnosis diabetes.    Last time he attended diabetes, the focus of the discussion was on taking medications appropriately, the importance of exercise, modest weight loss and portion limitation.    PROGRESS TOWARD GOALS:    He is exercising daily, either on an exercise bike, or by walking.    He has lost 2 lbs in the past month.  He is taking his Metformin without fail.  Is not missing any doses.       ASSESSMENT:  Current Diabetes Management per Patient:  Taking diabetes medications?   yes:     Diabetes Medication(s)     Biguanides Sig    metFORMIN (GLUCOPHAGE-XR) 500 MG 24 hr tablet Take 1 tablet (500 mg) by mouth daily (with dinner)        Monitoring  Patient glucose self monitoring as follows: two times daily.   BG meter: Contour meter  BG results: see blood glucose log attached to this encounter     BG values are: In goal  Patient's most recent   Lab Results   Component Value Date    A1C 7.0 08/03/2017    is meeting goal of 7 or less.                     EDUCATION and INSTRUCTION PROVIDED AT THIS VISIT:       Given much positive feedback for making changes requested at our last visit.  He is exercising regularly, taking medication as prescribed.  He states that recently he ran out of his BP meds, and was sent to primary care clinic to make sure that refills were sent to his pharmacy.  Checked his BP at this visit:  160/76.  He states that the dizziness has improved.      His meter is outdated, so he was given a new Contour Next meter and prescriptions for testing supplies were sent to his pharmacy with refills.  Instructed to continue testing once a day and to alternate the times that he tests.       Encouraged to keep up the good work, continue his regular exercise routine, and to continue to take his medications as prescribed.  Encouraged that he is doing such a good job.  Reviewed his meter download with him and reiterated target blood glucoses with him.  Instructed to call if he starts to have blood glucoses before meals that are higher than 200.    Otherwise will see him in follow up in 3 months.  Appointments made.     Patient-stated goal written and given to Lamarlinda Karenrigurinder.  Verbalized and demonstrated understanding of instructions.     PLAN:  See patient instructions  AVS printed and given to patient    FOLLOW-UP:        Time spent with patient at today's visit was 30 minutes.      Any diabetes medication dose changes were made via the CDE Protocol and Collaborative Practice Agreement with [place] [facility].  A copy of this encounter was provided to patient's referring provider.

## 2017-11-07 ENCOUNTER — APPOINTMENT (OUTPATIENT)
Dept: GENERAL RADIOLOGY | Facility: CLINIC | Age: 67
End: 2017-11-07
Attending: EMERGENCY MEDICINE

## 2017-11-07 ENCOUNTER — HOSPITAL ENCOUNTER (EMERGENCY)
Facility: CLINIC | Age: 67
Discharge: HOME | End: 2017-11-07
Attending: EMERGENCY MEDICINE | Admitting: EMERGENCY MEDICINE

## 2017-11-07 ENCOUNTER — APPOINTMENT (OUTPATIENT)
Dept: CT IMAGING | Facility: CLINIC | Age: 67
End: 2017-11-07
Attending: EMERGENCY MEDICINE

## 2017-11-07 VITALS
WEIGHT: 173 LBS | OXYGEN SATURATION: 96 % | DIASTOLIC BLOOD PRESSURE: 77 MMHG | RESPIRATION RATE: 16 BRPM | SYSTOLIC BLOOD PRESSURE: 148 MMHG | BODY MASS INDEX: 27.87 KG/M2 | HEART RATE: 73 BPM | TEMPERATURE: 98 F

## 2017-11-07 DIAGNOSIS — M54.2 CERVICALGIA: ICD-10-CM

## 2017-11-07 DIAGNOSIS — R07.89 CHEST WALL PAIN: ICD-10-CM

## 2017-11-07 LAB — TROPONIN I SERPL-MCNC: <0.015 UG/L (ref 0–0.04)

## 2017-11-07 PROCEDURE — 93010 ELECTROCARDIOGRAM REPORT: CPT | Mod: Z6 | Performed by: EMERGENCY MEDICINE

## 2017-11-07 PROCEDURE — 93308 TTE F-UP OR LMTD: CPT | Performed by: EMERGENCY MEDICINE

## 2017-11-07 PROCEDURE — 72072 X-RAY EXAM THORAC SPINE 3VWS: CPT

## 2017-11-07 PROCEDURE — 93308 TTE F-UP OR LMTD: CPT | Mod: 26 | Performed by: EMERGENCY MEDICINE

## 2017-11-07 PROCEDURE — 25000132 ZZH RX MED GY IP 250 OP 250 PS 637: Performed by: EMERGENCY MEDICINE

## 2017-11-07 PROCEDURE — 76705 ECHO EXAM OF ABDOMEN: CPT | Performed by: EMERGENCY MEDICINE

## 2017-11-07 PROCEDURE — 76705 ECHO EXAM OF ABDOMEN: CPT | Mod: 26 | Performed by: EMERGENCY MEDICINE

## 2017-11-07 PROCEDURE — 72125 CT NECK SPINE W/O DYE: CPT

## 2017-11-07 PROCEDURE — 99285 EMERGENCY DEPT VISIT HI MDM: CPT | Mod: 25 | Performed by: EMERGENCY MEDICINE

## 2017-11-07 PROCEDURE — 84484 ASSAY OF TROPONIN QUANT: CPT | Performed by: EMERGENCY MEDICINE

## 2017-11-07 PROCEDURE — 71020 XR CHEST 2 VW: CPT

## 2017-11-07 PROCEDURE — 93005 ELECTROCARDIOGRAM TRACING: CPT | Performed by: EMERGENCY MEDICINE

## 2017-11-07 RX ORDER — CYCLOBENZAPRINE HCL 5 MG
5 TABLET ORAL 2 TIMES DAILY PRN
Qty: 8 TABLET | Refills: 0 | Status: SHIPPED | OUTPATIENT
Start: 2017-11-07 | End: 2020-08-07

## 2017-11-07 RX ORDER — OXYCODONE HYDROCHLORIDE 5 MG/1
5 TABLET ORAL ONCE
Status: COMPLETED | OUTPATIENT
Start: 2017-11-07 | End: 2017-11-07

## 2017-11-07 RX ORDER — IBUPROFEN 600 MG/1
600 TABLET, FILM COATED ORAL ONCE
Status: COMPLETED | OUTPATIENT
Start: 2017-11-07 | End: 2017-11-07

## 2017-11-07 RX ADMIN — OXYCODONE HYDROCHLORIDE 5 MG: 5 TABLET ORAL at 13:15

## 2017-11-07 RX ADMIN — IBUPROFEN 600 MG: 600 TABLET ORAL at 13:15

## 2017-11-07 ASSESSMENT — ENCOUNTER SYMPTOMS
ABDOMINAL PAIN: 0
WOUND: 0
CHILLS: 1
SHORTNESS OF BREATH: 0
HEADACHES: 1
NAUSEA: 0
DYSURIA: 0
VOMITING: 0
NECK PAIN: 1
NUMBNESS: 0
BACK PAIN: 1
WEAKNESS: 0
FEVER: 0
DIZZINESS: 1
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
## 2017-11-07 NOTE — ED AVS SNAPSHOT
[facility], [place], Emergency Department    [address]    [facility] [address]    Phone:  [phone]    Fax:  [phone]                                       Noemí Lassiter   MRN: [id number]    Department:  [facility], Emergency Department   Date of Visit:  11/7/2017           After Visit Summary Signature Page     I have received my discharge instructions, and my questions have been answered. I have discussed any challenges I see with this plan with the nurse or doctor.    ..........................................................................................................................................  Patient/Patient Representative Signature      ..........................................................................................................................................  Patient Representative Print Name and Relationship to Patient    ..................................................               ................................................  Date                                            Time    ..........................................................................................................................................  Reviewed by Signature/Title    ...................................................              ..............................................  Date                                                            Time

## 2017-11-08 LAB — INTERPRETATION ECG - MUSE: NORMAL

## 2017-12-04 ENCOUNTER — PRE VISIT (OUTPATIENT)
Dept: UROLOGY | Facility: CLINIC | Age: 67
End: 2017-12-04

## 2017-12-05 ENCOUNTER — PRE VISIT (OUTPATIENT)
Dept: UROLOGY | Facility: CLINIC | Age: 67
End: 2017-12-05

## 2017-12-05 NOTE — TELEPHONE ENCOUNTER
Pt coming in for an incontinence consult. Pt contacted by [facility] and given instructions for a 24 hour pad weight.

## 2017-12-11 ENCOUNTER — OFFICE VISIT (OUTPATIENT)
Dept: UROLOGY | Facility: CLINIC | Age: 67
End: 2017-12-11

## 2017-12-11 VITALS
HEART RATE: 85 BPM | DIASTOLIC BLOOD PRESSURE: 66 MMHG | BODY MASS INDEX: 28.38 KG/M2 | HEIGHT: 66 IN | SYSTOLIC BLOOD PRESSURE: 134 MMHG | WEIGHT: 176.6 LBS

## 2017-12-11 DIAGNOSIS — N39.41 URGE URINARY INCONTINENCE: Primary | ICD-10-CM

## 2017-12-11 RX ORDER — TOLTERODINE 2 MG/1
2 CAPSULE, EXTENDED RELEASE ORAL DAILY
Qty: 90 CAPSULE | Refills: 1 | Status: CANCELLED | OUTPATIENT
Start: 2017-12-11

## 2017-12-11 ASSESSMENT — PAIN SCALES - GENERAL: PAINLEVEL: NO PAIN (0)

## 2017-12-11 NOTE — MR AVS SNAPSHOT
After Visit Summary   12/11/2017    Noemí Lassiter    MRN: [id number]           Patient Information     Date Of Birth          1950        Visit Information        Provider Department      12/11/2017 6:45 AM Frederick Renee Sean Patrick, MD [facility]        Today's Diagnoses     Urge urinary incontinence    -  1      Care Instructions    Discontinue Tamsulosin and start Detrol once a day.    Follow up with Analia Desai PA-C in one month with a voiding diary.    It was a pleasure meeting with you today.  Thank you for allowing me and my team the privilege of caring for you today.  YOU are the reason we are here, and I truly hope we provided you with the excellent service you deserve.  Please let us know if there is anything else we can do for you so that we can be sure you are leaving completely satisfied with your care experience.                  Follow-ups after your visit        Your next 10 appointments already scheduled     Jan 12, 2018  9:30 AM CST   (Arrive by 9:15 AM)   Office Visit with Rosalinda Ladd RD   [facility])    [address] [place] [address] [phone]           Bring a current list of meds and any records pertaining to this visit. For Physicals, please bring immunization records and any forms needing to be filled out. Please arrive 10 minutes early to complete paperwork.            Jan 12, 2018 10:30 AM CST   (Arrive by 10:15 AM)   Office Visit with Christianne Wu RN   [facility] ([facility])    [address] [place] [address] [phone]           Bring a current list of meds and any records pertaining to this visit. For Physicals, please bring immunization records and any forms needing to be filled out. Please arrive 10 minutes early to complete paperwork.            Feb 12,  " 10:30 AM CST   (Arrive by 10:15 AM)   Return Visit with Analia Desai PA-C   [facility] and [facility] ([facility])    [address] [place] [address] [phone]              Who to contact     Please call your clinic at [phone] to:    Ask questions about your health    Make or cancel appointments    Discuss your medicines    Learn about your test results    Speak to your doctor   If you have compliments or concerns about an experience at your clinic, or if you wish to file a complaint, please contact [facility] Patient Relations at [phone] or email us at [email or web address]         Additional Information About Your Visit        MyChart Information     [employer] is an electronic gateway that provides easy, online access to your medical records. With [employer], you can request a clinic appointment, read your test results, renew a prescription or communicate with your care team.     To sign up for [employer] visit the website at [email or web address]   You will be asked to enter the access code listed below, as well as some personal information. Please follow the directions to create your username and password.     Your access code is: [email or web address]  Expires: 2018  5:30 AM     Your access code will  in 90 days. If you need help or a new code, please contact your [facility] or call [phone] for assistance.        Care EveryWhere ID     This is your Care EveryWhere ID. This could be used by other organizations to access your [facility] medical records  [id number]        Your Vitals Were     Pulse Height BMI (Body Mass Index)             85 1.676 m (5' 6\") 28.5 kg/m2          Blood Pressure from Last 3 Encounters:   17 134/66   17 148/77   10/06/17 160/76    Weight from Last 3 Encounters:   17 80.1 kg (176 lb 9.6 " oz)   11/07/17 78.5 kg (173 lb)   10/06/17 79.8 kg (176 lb)              Today, you had the following     No orders found for display       Primary Care Provider Office Phone # Fax #    Bay Miller [phone]       [address]  [facility] [address]        Equal Access to Services     OFELIA ALEXIS : Hadii aad ku hadasho Soomaali, waaxda luqadaha, qaybta kaalmada adeegyada, waxay idiin hayaan adeeg kharash la'aan ah. So [facility] [phone].    ATENCIÓN: Si habla español, tiene a kern disposición servicios gratuitos de asistencia lingüística. Zheng al [phone].    We comply with applicable federal civil rights laws and [place] laws. We do not discriminate on the basis of race, color, national origin, age, disability, sex, sexual orientation, or gender identity.            Thank you!     Thank you for choosing [facility] AND [facility]  for your care. Our goal is always to provide you with excellent care. Hearing back from our patients is one way we can continue to improve our services. Please take a few minutes to complete the written survey that you may receive in the mail after your visit with us. Thank you!             Your Updated Medication List - Protect others around you: Learn how to safely use, store and throw away your medicines at www.disposemymeds.org.          This list is accurate as of: 12/11/17  8:11 AM.  Always use your most recent med list.                   Brand Name Dispense Instructions for use Diagnosis    acetaminophen 325 MG tablet    TYLENOL     Take 650 mg by mouth        Alcohol Prep Pads     100 each    1 each 2 times daily    Type 2 diabetes mellitus without complication, without long-term current use of insulin (H)       aspirin 81 MG tablet     60 tablet    Take 1 tablet (81 mg) by mouth daily    Encounter for routine adult health examination without abnormal findings       blood glucose monitoring test  strip    MANUEL CONTOUR NEXT    100 strip    Use to test blood sugar 2 times daily or as directed.  Ok to substitute alternative if insurance prefers.    Type 2 diabetes mellitus without complication, without long-term current use of insulin (H)       cyclobenzaprine 5 MG tablet    FLEXERIL    8 tablet    Take 1 tablet (5 mg) by mouth 2 times daily as needed for muscle spasms        D 2000 2000 UNITS tablet   Generic drug:  cholecalciferol      Take 2,000 Units by mouth        DAILY VITES Tabs      TAKE 1 TABLET BY MOUTH EVERY DAY        hydrochlorothiazide 12.5 MG Tabs tablet     180 tablet    Take 2 tablets (25 mg) by mouth daily    Benign essential hypertension       ibuprofen 600 MG tablet    ADVIL/MOTRIN     Take 600 mg by mouth        losartan 100 MG tablet    COZAAR    90 tablet    Take 1 tablet (100 mg) by mouth daily    Benign essential hypertension       metFORMIN 500 MG 24 hr tablet    GLUCOPHAGE-XR    30 tablet    Take 1 tablet (500 mg) by mouth daily (with dinner)    Type 2 diabetes mellitus without complication, without long-term current use of insulin (H)       simvastatin 40 MG tablet    ZOCOR    30 tablet    Take 1 tablet (40 mg) by mouth At Bedtime    Hyperlipidemia, unspecified hyperlipidemia type       tamsulosin 0.4 MG capsule    FLOMAX     Take 0.4 mg by mouth        Vitamin-B Complex Tabs

## 2017-12-11 NOTE — PATIENT INSTRUCTIONS
Discontinue Tamsulosin and start Detrol once a day.    Follow up with Analia Desai PA-C in one month with a voiding diary.    It was a pleasure meeting with you today.  Thank you for allowing me and my team the privilege of caring for you today.  YOU are the reason we are here, and I truly hope we provided you with the excellent service you deserve.  Please let us know if there is anything else we can do for you so that we can be sure you are leaving completely satisfied with your care experience.

## 2017-12-11 NOTE — NURSING NOTE
"Chief Complaint   Patient presents with     Incontinence     Consult       Blood pressure 134/66, pulse 85, height 1.676 m (5' 6\"), weight 80.1 kg (176 lb 9.6 oz). Body mass index is 28.5 kg/(m^2).    Patient Active Problem List   Diagnosis     Sensory hearing loss, bilateral     Swelling of limb     Hyperlipidemia, unspecified hyperlipidemia type     Type 2 diabetes mellitus without complication, without long-term current use of insulin (H)     Benign essential hypertension       No Known Allergies    Current Outpatient Prescriptions   Medication Sig Dispense Refill     cyclobenzaprine (FLEXERIL) 5 MG tablet Take 1 tablet (5 mg) by mouth 2 times daily as needed for muscle spasms 8 tablet 0     hydrochlorothiazide 12.5 MG TABS tablet Take 2 tablets (25 mg) by mouth daily 180 tablet 3     Alcohol Swabs (ALCOHOL PREP) PADS 1 each 2 times daily 100 each 3     blood glucose monitoring ([employer] CONTOUR NEXT) test strip Use to test blood sugar 2 times daily or as directed.  Ok to substitute alternative if insurance prefers. 100 strip prn     metFORMIN (GLUCOPHAGE-XR) 500 MG 24 hr tablet Take 1 tablet (500 mg) by mouth daily (with dinner) 30 tablet 3     aspirin 81 MG tablet Take 1 tablet (81 mg) by mouth daily 60 tablet 0     simvastatin (ZOCOR) 40 MG tablet Take 1 tablet (40 mg) by mouth At Bedtime 30 tablet      losartan (COZAAR) 100 MG tablet Take 1 tablet (100 mg) by mouth daily 90 tablet 0     acetaminophen (TYLENOL) 325 MG tablet Take 650 mg by mouth       Multiple Vitamin (DAILY VITES) TABS TAKE 1 TABLET BY MOUTH EVERY DAY       ibuprofen (ADVIL/MOTRIN) 600 MG tablet Take 600 mg by mouth       tamsulosin (FLOMAX) 0.4 MG capsule Take 0.4 mg by mouth       B Complex Vitamins (VITAMIN-B COMPLEX) TABS        cholecalciferol (D 2000) 2000 UNITS tablet Take 2,000 Units by mouth         Social History   Substance Use Topics     Smoking status: Never Smoker     Smokeless tobacco: Never Used     Alcohol use No       Merry " LILY Arias  12/11/2017  7:13 AM

## 2017-12-11 NOTE — PROGRESS NOTES
"Urology Consult    Name:  Noemí Lassiter  MRN:  [id number]  Age/: 67 year old, 1950    CC: Urinary Incontinence    HPI: Noemí Lassiter is a(n) 67 year old male with a past medical history notable for prostatic enlargement, diabetes, cervical stenosis, urge urinary incontinence, HLD & HTN referred for evaluation of urinary incontinence.  The patient has [place] [facility] [place] of dysuria, urgency, & retention in .  At that time, he was started on Proscar & doxazosin with instructions to follow up in one years' time.  He has hadhis medications refilled since then but does not appear to have followed up.    In discussion with the patient about his incontinence, he thinks this has been going on since .  He is currently taking tamsulosin but notes that this is not helping.  He has incontinence every day.  He is not wearing a pad or diaper.  He does not feel as though he is able to empty his bladder.  He thinks he is able to \"empty half.\"  He thinks that his urine stream is quite slow.  Denies gross hematuria or urinary tract infections.  Denies trauma to the penis or perineum.  He does have a remote history of a back injury.  Has some symptoms consistent with urge incontinence.  Has nocturia and also has urine leakage at night.      He denies previous surgeries to the penis, urethra, bladder, prostate, and kidneys.  Previous PSAs reviewed: [place] [facility] [place] 1.7 in 2014 (unsure if 2014 value was on Proscar).    This encounter was don via in-person .      Past Medical History:  Past Medical History:   Diagnosis Date     BPH (benign prostatic hyperplasia)      DM (diabetes mellitus), type 2 (H)      HLD (hyperlipidemia)      HTN (hypertension)        Past Surgical History:  Past Surgical History:   Procedure Laterality Date     C PROSTHETIC EYE OTHER TYPE Right     In [place]      CATARACT IOL, RT/LT Left        Allergies:   No Known " Allergies    Medications:    Current Outpatient Prescriptions on File Prior to Visit:  cyclobenzaprine (FLEXERIL) 5 MG tablet Take 1 tablet (5 mg) by mouth 2 times daily as needed for muscle spasms   hydrochlorothiazide 12.5 MG TABS tablet Take 2 tablets (25 mg) by mouth daily   Alcohol Swabs (ALCOHOL PREP) PADS 1 each 2 times daily   blood glucose monitoring (MANUEL CONTOUR NEXT) test strip Use to test blood sugar 2 times daily or as directed.  Ok to substitute alternative if insurance prefers.   metFORMIN (GLUCOPHAGE-XR) 500 MG 24 hr tablet Take 1 tablet (500 mg) by mouth daily (with dinner)   aspirin 81 MG tablet Take 1 tablet (81 mg) by mouth daily   simvastatin (ZOCOR) 40 MG tablet Take 1 tablet (40 mg) by mouth At Bedtime   losartan (COZAAR) 100 MG tablet Take 1 tablet (100 mg) by mouth daily   acetaminophen (TYLENOL) 325 MG tablet Take 650 mg by mouth   aspirin EC 81 MG EC tablet Take 81 mg by mouth   Multiple Vitamin (DAILY VITES) TABS TAKE 1 TABLET BY MOUTH EVERY DAY   ibuprofen (ADVIL/MOTRIN) 600 MG tablet Take 600 mg by mouth   tamsulosin (FLOMAX) 0.4 MG capsule Take 0.4 mg by mouth   B Complex Vitamins (VITAMIN-B COMPLEX) TABS    cholecalciferol (D 2000) 2000 UNITS tablet Take 2,000 Units by mouth     No current facility-administered medications on file prior to visit.     Social History:  Social History     Social History     Marital status:      Spouse name: N/A     Number of children: N/A     Years of education: N/A     Occupational History     Not on file.     Social History Main Topics     Smoking status: Never Smoker     Smokeless tobacco: Never Used     Alcohol use No     Drug use: No     Sexual activity: Not on file     Other Topics Concern     Not on file     Social History Narrative       Family History:  Family History   Problem Relation Age of Onset     Eye Surgery Paternal Grandfather        ROS:  The remainder of the complete ROS was negative unless noted in the HPI.    Exam:  BP  "134/66  Pulse 85  Ht 1.676 m (5' 6\")  Wt 80.1 kg (176 lb 9.6 oz)  BMI 28.5 kg/m2  General: Alert, interactive, & in NAD; accompanied by   Resp: Breathing is non-labored on room air  Cardiac: Extremities warm  Abdomen: Soft, non-tender, nondistended.  : Normal circumcised phallus with orthotopic meatus.  Both testes are down, non-tender, and without nodules.  Prostate is rubbery; ~45 gr.  No nodules present.  Non-tender.  Extremities: No LE edema or obvious joint abnormalities  Skin: Warm and dry, no jaundice or rash    Labs:  All laboratory data reviewed    UroFlow Today  Pattern: Bell-shaped   Qmax:  21.1 mL/sec  Avg Flow: 12.0 mL/sec  VV:  276 mL  PVR:  56 mL    Assessment and Plan: Noemí Lassiter is a(n) 67 year old male presenting with complaints of urinary incontinence, incomplete emptying, slow stream, as well as symptoms consistent with urge urinary incontinence.  Though the patient has complaints of a weak stream and incomplete emptying, his UroFlow today is quite reassuring and near normal.  He has a nice bell-shaped curve with a Qmax > 20 mL/sec and his post-void residual is only 56 mL.  We will plan to treat the patient for his urge urinary incontinence.     - Begin Detrol daily   - Will have patient complete a voiding diary   - Patient to follow up with Analia Desai in 1 month to review voiding diary & reassess symptoms    This patient's exam findings, labs, and imaging discussed with urology staff surgeon Dr. Mandujano, who developed the treatment plan and who saw the patient with me.  =======================================================  As the attending surgeon I, Herb Mandujano, interviewed and examined the patient. The plan was developed between me and the patient. My findings and plan are as stated by the resident.    Herb Mandujano MD    "

## 2017-12-11 NOTE — LETTER
"2017       RE: Noemí Lassiter  [address]   [facility] [address]     Dear Colleague,    Thank you for referring your patient, Noemí Lassiter, to the [facility] UROLOGY AND INST FOR PROSTATE AND UROLOGIC CANCERS at [facility]. Please see a copy of my visit note below.    Urology Consult    Name:  Noemí Lassiter  MRN:  [id number]  Age/: 67 year old, 1950    CC: Urinary Incontinence    HPI: Noemí Lassiter is a(n) 67 year old male with a past medical history notable for prostatic enlargement, diabetes, cervical stenosis, urge urinary incontinence, HLD & HTN referred for evaluation of urinary incontinence.  The patient has previously been seen with [place]. Leach at [facility] [place] symptoms of dysuria, urgency, & retention in .  At that time, he was started on Proscar & doxazosin with instructions to follow up in one years' time.  He has hadhis medications refilled since then but does not appear to have followed up.    In discussion with the patient about his incontinence, he thinks this has been going on since .  He is currently taking tamsulosin but notes that this is not helping.  He has incontinence every day.  He is not wearing a pad or diaper.  He does not feel as though he is able to empty his bladder.  He thinks he is able to \"empty half.\"  He thinks that his urine stream is quite slow.  Denies gross hematuria or urinary tract infections.  Denies trauma to the penis or perineum.  He does have a remote history of a back injury.  Has some symptoms consistent with urge incontinence.  Has nocturia and also has urine leakage at night.      He denies previous surgeries to the penis, urethra, bladder, prostate, and kidneys.  Previous PSAs reviewed: 2.8 done in  at [facility] and 1.7 in 2014 (unsure if 2014 value was on Proscar).    This encounter was don via in-person .      Past Medical History:  Past Medical History: "   Diagnosis Date     BPH (benign prostatic hyperplasia)      DM (diabetes mellitus), type 2 (H)      HLD (hyperlipidemia)      HTN (hypertension)        Past Surgical History:  Past Surgical History:   Procedure Laterality Date     C PROSTHETIC EYE OTHER TYPE Right     In [place]      CATARACT IOL, RT/LT Left        Allergies:   No Known Allergies    Medications:    Current Outpatient Prescriptions on File Prior to Visit:  cyclobenzaprine (FLEXERIL) 5 MG tablet Take 1 tablet (5 mg) by mouth 2 times daily as needed for muscle spasms   hydrochlorothiazide 12.5 MG TABS tablet Take 2 tablets (25 mg) by mouth daily   Alcohol Swabs (ALCOHOL PREP) PADS 1 each 2 times daily   blood glucose monitoring (MANUEL CONTOUR NEXT) test strip Use to test blood sugar 2 times daily or as directed.  Ok to substitute alternative if insurance prefers.   metFORMIN (GLUCOPHAGE-XR) 500 MG 24 hr tablet Take 1 tablet (500 mg) by mouth daily (with dinner)   aspirin 81 MG tablet Take 1 tablet (81 mg) by mouth daily   simvastatin (ZOCOR) 40 MG tablet Take 1 tablet (40 mg) by mouth At Bedtime   losartan (COZAAR) 100 MG tablet Take 1 tablet (100 mg) by mouth daily   acetaminophen (TYLENOL) 325 MG tablet Take 650 mg by mouth   aspirin EC 81 MG EC tablet Take 81 mg by mouth   Multiple Vitamin (DAILY VITES) TABS TAKE 1 TABLET BY MOUTH EVERY DAY   ibuprofen (ADVIL/MOTRIN) 600 MG tablet Take 600 mg by mouth   tamsulosin (FLOMAX) 0.4 MG capsule Take 0.4 mg by mouth   B Complex Vitamins (VITAMIN-B COMPLEX) TABS    cholecalciferol (D 2000) 2000 UNITS tablet Take 2,000 Units by mouth     No current facility-administered medications on file prior to visit.     Social History:  Social History     Social History     Marital status:      Spouse name: N/A     Number of children: N/A     Years of education: N/A     Occupational History     Not on file.     Social History Main Topics     Smoking status: Never Smoker     Smokeless tobacco: Never Used      "Alcohol use No     Drug use: No     Sexual activity: Not on file     Other Topics Concern     Not on file     Social History Narrative       Family History:  Family History   Problem Relation Age of Onset     Eye Surgery Paternal Grandfather        ROS:  The remainder of the complete ROS was negative unless noted in the HPI.    Exam:  /66  Pulse 85  Ht 1.676 m (5' 6\")  Wt 80.1 kg (176 lb 9.6 oz)  BMI 28.5 kg/m2  General: Alert, interactive, & in NAD; accompanied by   Resp: Breathing is non-labored on room air  Cardiac: Extremities warm  Abdomen: Soft, non-tender, nondistended.  : Normal circumcised phallus with orthotopic meatus.  Both testes are down, non-tender, and without nodules.  Prostate is rubbery; ~45 gr.  No nodules present.  Non-tender.  Extremities: No LE edema or obvious joint abnormalities  Skin: Warm and dry, no jaundice or rash    Labs:  All laboratory data reviewed    UroFlow Today  Pattern: Bell-shaped   Qmax:  21.1 mL/sec  Avg Flow: 12.0 mL/sec  VV:  276 mL  PVR:  56 mL    Assessment and Plan: Noemí Lassiter is a(n) 67 year old male presenting with complaints of urinary incontinence, incomplete emptying, slow stream, as well as symptoms consistent with urge urinary incontinence.  Though the patient has complaints of a weak stream and incomplete emptying, his UroFlow today is quite reassuring and near normal.  He has a nice bell-shaped curve with a Qmax > 20 mL/sec and his post-void residual is only 56 mL.  We will plan to treat the patient for his urge urinary incontinence.     - Begin Detrol daily   - Will have patient complete a voiding diary   - Patient to follow up with Analia Desai in 1 month to review voiding diary & reassess symptoms    This patient's exam findings, labs, and imaging discussed with urology staff surgeon Dr. Mandujano, who developed the treatment plan and who saw the patient with me.  =======================================================  As the " attending surgeon I, Herb Mandujano, interviewed and examined the patient. The plan was developed between me and the patient. My findings and plan are as stated by the resident.    Herb Mandujano MD

## 2017-12-12 DIAGNOSIS — N39.41 URGE INCONTINENCE OF URINE: Primary | ICD-10-CM

## 2017-12-12 RX ORDER — TOLTERODINE 4 MG/1
4 CAPSULE, EXTENDED RELEASE ORAL DAILY
Qty: 30 CAPSULE | Refills: 3 | Status: SHIPPED | OUTPATIENT
Start: 2017-12-12 | End: 2019-08-13

## 2018-01-12 ENCOUNTER — OFFICE VISIT (OUTPATIENT)
Dept: EDUCATION SERVICES | Facility: CLINIC | Age: 68
End: 2018-01-12
Payer: COMMERCIAL

## 2018-01-12 VITALS — BODY MASS INDEX: 27.7 KG/M2 | WEIGHT: 171.6 LBS

## 2018-01-12 DIAGNOSIS — E11.9 DIABETES MELLITUS WITHOUT COMPLICATION (H): Primary | ICD-10-CM

## 2018-01-12 DIAGNOSIS — E11.9 TYPE 2 DIABETES MELLITUS WITHOUT COMPLICATION, WITHOUT LONG-TERM CURRENT USE OF INSULIN (H): ICD-10-CM

## 2018-01-12 RX ORDER — METFORMIN HCL 500 MG
500 TABLET, EXTENDED RELEASE 24 HR ORAL 2 TIMES DAILY WITH MEALS
Qty: 60 TABLET | Refills: 3 | Status: SHIPPED | OUTPATIENT
Start: 2018-01-12 | End: 2022-11-18

## 2018-01-12 NOTE — PROGRESS NOTES
"  Diabetes Self Management Training: Follow-up Visit    Noemí Lassiter presents today for education related to Type 2 diabetes.    He is accompanied by     Patient Problem List and Family Medical History reviewed for relevant medical history, current medical status, and diabetes risk factors.    Current Diabetes Management per Patient:  Taking diabetes medications?   yes:     Diabetes Medication(s)     Biguanides Sig    metFORMIN (GLUCOPHAGE-XR) 500 MG 24 hr tablet Take 1 tablet (500 mg) by mouth 2 times daily (with meals)    metFORMIN (GLUCOPHAGE-XR) 500 MG 24 hr tablet Take 1 tablet (500 mg) by mouth daily (with dinner)          Past Diabetes Education: Yes    Patient glucose self monitoring as follows: 1-2 times daily.   BG results: fasting glucose- 133, 160, 151, 158, 170, 157, 153, 145, 148, 154, pre-lunch glucose- 111, 199, pre-supper glucose- 101, 127 and bedtime- 131, 121, 161, 113     Vitals:  Wt 77.8 kg (171 lb 9.6 oz)  BMI 27.7 kg/m2  Estimated body mass index is 27.7 kg/(m^2) as calculated from the following:    Height as of 12/11/17: 1.676 m (5' 6\").    Weight as of this encounter: 77.8 kg (171 lb 9.6 oz).   Last 3 BP:   BP Readings from Last 3 Encounters:   12/11/17 134/66   11/07/17 148/77   10/06/17 160/76     History   Smoking Status     Never Smoker   Smokeless Tobacco     Never Used       Labs:  Lab Results   Component Value Date    A1C 7.0 08/03/2017     Lab Results   Component Value Date    GLC 93 07/05/2017     Lab Results   Component Value Date     05/04/2017     HDL Cholesterol   Date Value Ref Range Status   05/04/2017 51 >39 mg/dL Final   ]  GFR Estimate   Date Value Ref Range Status   05/25/2017 >90  Non  GFR Calc   >60 mL/min/1.7m2 Final     GFR Estimate If Black   Date Value Ref Range Status   05/25/2017 >90   GFR Calc   >60 mL/min/1.7m2 Final     Lab Results   Component Value Date    CR 0.81 05/25/2017     No results found for: " MICROALBUMIN    Nutrition Review:  Noemí returns for diabetes/nutrition ed with the [facility] . He brings his bg meter with him. He tells me he takes 500 mg/ metformin once/day. He also tells me he has transferred his primary care to the [facility] in [place] on [address]. He continues to attend Adult  5 days/wk from 8AM-2PM. He has a home health aide 2x/wk who comes to his apartment and will cook food for him. HE tells me he uses an eliptical machine at the  a few days/wk for 5 minutes at a time a few times through the day, and at home he has a stationary bike which he may also use a couple times per day. He has not been outside walking since the weather turned cold.     Diet Recall:   Breakfast: 2 injera with oil and tea or with jam and cheese or soup and injera or bread/pb, tea with splenda at  or with 1 spoon sugar at home  AM snack:none  Lunch: meat/chicken/rice/veg or soup/bread, sometimes banana, small glass orange juice or water  PM snack:none  Dinner:cereal/milk or fish/mixed vegetables  Evening snack:none    Eats out in restaurants: about never    Gave Noemí lots of positive feedback for making above lifestyle changes resulting in a  4.5 pound weight loss. Discussed increasing his metformin dose as his fasting blood sugars have increased since our last visit despite his diet and exercise routine. This will be reviewed with Christianne MATHEW after this appointment as well..       Education provided today on:  AADE Self-Care Behaviors:  Healthy Eating: weight reduction, heart healthy diet and portion control  Being Active: relationship to blood glucose and describe appropriate activity program  Taking Medication: action of prescribed medication    Pt verbalized understanding of concepts discussed and recommendations provided today.       ASSESSMENT: Noemí is doing better with diet and exercising as much as he is able, however his FBG are elevated and he is  only taking 500mg/metformin per day. Since he has transferred his primary care to the [facility], I will discuss with Christianne MATHEW who will also see him today after this appointment.       PLAN:  Continue with diet and exercise as able  Need to increase metformin, discuss with Christianne MATHEW  AVS printed and provided to patient today.    FOLLOW-UP:  As needed    Time Spent: 60 minutes  Encounter Type: Individual    Any diabetes medication dose changes were made via the CDE Protocol and Collaborative Practice Agreement with the patient's referring provider. A copy of this encounter was shared with the provider.

## 2018-01-12 NOTE — MR AVS SNAPSHOT
After Visit Summary   1/12/2018    Noemí Lassiter    MRN: [id number]           Patient Information     Date Of Birth          1950        Visit Information        Provider Department      1/12/2018 9:15 AM Rosalinda Ladd RD; [facility] Diabetes        Today's Diagnoses     Diabetes mellitus without complication (H)    -  1       Follow-ups after your visit        Your next 10 appointments already scheduled     Jan 19, 2018  3:45 PM CST   (Arrive by 3:30 PM)   Return Visit with Kota Decker MD   [place] [facility] Ear Nose and Throat ([facility])    [address] [place] [id number] [phone]            Feb 12, 2018 10:30 AM CST   (Arrive by 10:15 AM)   Return Visit with Analia Desai PA-C   [facility] Urology and [facility] for Prostate and Urologic Cancers ([facility])    [address] [place] [address] [id number] [phone]              Who to contact     Please call your clinic at [phone] to:    Ask questions about your health    Make or cancel appointments    Discuss your medicines    Learn about your test results    Speak to your doctor   If you have compliments or concerns about an experience at your clinic, or if you wish to file a complaint, please contact [facility] Patient Relations at [phone] or email us at [email or web address]         Additional Information About Your Visit        MyChart Information     [employer] is an electronic gateway that provides easy, online access to your medical records. With [facility], you can request a clinic appointment, read your test results, renew a prescription or communicate with your care team.     To sign up for [employer] visit the website at [email or web address]   You will be asked to enter the access code listed below, as well as some personal information. Please follow  the directions to create your username and password.     Your access code is: [email or web address]  Expires: 2018  5:30 AM     Your access code will  in 90 days. If you need help or a new code, please contact your [facility] or call [phone] for assistance.        Care EveryWhere ID     This is your Care EveryWhere ID. This could be used by other organizations to access your [facility] medical records  [id number]        Your Vitals Were     BMI (Body Mass Index)                   27.7 kg/m2            Blood Pressure from Last 3 Encounters:   17 134/66   17 148/77   10/06/17 160/76    Weight from Last 3 Encounters:   18 77.8 kg (171 lb 9.6 oz)   17 80.1 kg (176 lb 9.6 oz)   17 78.5 kg (173 lb)              We Performed the Following     DIABETES EDUCATION - Individual  []          Today's Medication Changes          These changes are accurate as of: 18  1:27 PM.  If you have any questions, ask your nurse or doctor.               These medicines have changed or have updated prescriptions.        Dose/Directions    metFORMIN 500 MG 24 hr tablet   Commonly known as:  GLUCOPHAGE-XR   This may have changed:  when to take this   Used for:  Type 2 diabetes mellitus without complication, without long-term current use of insulin (H)   Changed by:  Christianne Wu, RN        Dose:  500 mg   Take 1 tablet (500 mg) by mouth 2 times daily (with meals)   Quantity:  60 tablet   Refills:  3            Where to get your medicines      These medications were sent to [facility], [facility] - [address] [address]     Phone:  [phone]     metFORMIN 500 MG 24 hr tablet                Primary Care Provider Office Phone # Fax #    Bay Miller [phone]       [address]  [facility] [address]        Equal Access to Services     OFELIA ALEXIS AH:  Hadii aad ku hadtiffaniecaron Somaria dali, waaxda luqadaha, qaybta kaalmada jonathan, miriam massieljenniffer valladares. So [facility] [phone].    ATENCIÓN: Si jacques urias, tiene a kern disposición servicios gratuitos de asistencia lingüística. Llame al [phone].    We comply with applicable federal civil rights laws and [place] laws. We do not discriminate on the basis of race, color, national origin, age, disability, sex, sexual orientation, or gender identity.            Thank you!     Thank you for choosing [facility] DIABETES  for your care. Our goal is always to provide you with excellent care. Hearing back from our patients is one way we can continue to improve our services. Please take a few minutes to complete the written survey that you may receive in the mail after your visit with us. Thank you!             Your Updated Medication List - Protect others around you: Learn how to safely use, store and throw away your medicines at www.disposemymeds.org.          This list is accurate as of: 1/12/18  1:27 PM.  Always use your most recent med list.                   Brand Name Dispense Instructions for use Diagnosis    acetaminophen 325 MG tablet    TYLENOL     Take 650 mg by mouth        Alcohol Prep Pads     100 each    1 each 2 times daily    Type 2 diabetes mellitus without complication, without long-term current use of insulin (H)       aspirin 81 MG tablet     60 tablet    Take 1 tablet (81 mg) by mouth daily    Encounter for routine adult health examination without abnormal findings       blood glucose monitoring test strip    MANUEL CONTOUR NEXT    100 strip    Use to test blood sugar 2 times daily or as directed.  Ok to substitute alternative if insurance prefers.    Type 2 diabetes mellitus without complication, without long-term current use of insulin (H)       cyclobenzaprine 5 MG tablet    FLEXERIL    8 tablet    Take 1 tablet (5 mg) by mouth 2 times daily as needed for muscle spasms        D 2000  2000 UNITS tablet   Generic drug:  cholecalciferol      Take 2,000 Units by mouth        DAILY VITES Tabs      TAKE 1 TABLET BY MOUTH EVERY DAY        hydrochlorothiazide 12.5 MG Tabs tablet     180 tablet    Take 2 tablets (25 mg) by mouth daily    Benign essential hypertension       ibuprofen 600 MG tablet    ADVIL/MOTRIN     Take 600 mg by mouth        losartan 100 MG tablet    COZAAR    90 tablet    Take 1 tablet (100 mg) by mouth daily    Benign essential hypertension       metFORMIN 500 MG 24 hr tablet    GLUCOPHAGE-XR    60 tablet    Take 1 tablet (500 mg) by mouth 2 times daily (with meals)    Type 2 diabetes mellitus without complication, without long-term current use of insulin (H)       simvastatin 40 MG tablet    ZOCOR    30 tablet    Take 1 tablet (40 mg) by mouth At Bedtime    Hyperlipidemia, unspecified hyperlipidemia type       tamsulosin 0.4 MG capsule    FLOMAX     Take 0.4 mg by mouth        tolterodine 4 MG 24 hr capsule    DETROL LA    30 capsule    Take 1 capsule (4 mg) by mouth daily    Urge incontinence of urine       Vitamin-B Complex Tabs

## 2018-01-12 NOTE — PROGRESS NOTES
DIABETES EDUCATION NOTE:    Noemí Lassiter presents today for education related to Type 2 diabetes    Patient is being treated with:  oral agents, diet and exercise  He is accompanied by self and     PATIENT CONCERNS RELATED TO DIABETES SELF MANAGEMENT:     Here for follow up.  He saw the diabetes dietitian prior to this visit.      PROGRESS TOWARD GOALS:    Doing a good job of checking blood glucose.    Rosalinda Wilberto reviewed his activity and diet with him today.      ASSESSMENT:  Current Diabetes Management per Patient:  Taking diabetes medications? Oral Medications: Metformin - Dose: 500 mg, Time: breakfast   Monitoring  Patient glucose self monitoring as follows: one time daily.   BG meter: Contour Next  meter  BG results: fasting glucose- 117-170 and post-supper glucose- 130-220     BG values are: Not in goal  Patient's most recent   Lab Results   Component Value Date    A1C 7.0 08/03/2017    is meeting goal of <7.0    EDUCATION and INSTRUCTION PROVIDED AT THIS VISIT:       Discussed current diabetes management.  He is doing well, but blood glucoses still a higher than target albeit slightly.  If he could tolerate adding one 500 mg dose to his regimen, he would most likely get into target.  This explained to him and he agrees to try it.  He tells us today that he is receiving primary care at the [facility] and would like to have notes sent there.  He is a little leery of increasing his Metformin as he was started out on it at a higher dose and had some significant GI side effects.  Explained that the problems with diarrhea, etc are often seen when starting Metformin, particularly if the dose is too high, but since he is having no side effects on the current dose, it's unlikely that he will have side effects with the addition of 500 mg daily.      At this point, I do not feel that the patient needs to return for diabetes education.  Encouraged to call and make follow up appointments if his diabetes  becomes uncontrolled and he needs assistance.  Instructed on BG numbers that should prompt a phone call to his physician.      Patient-stated goal written and given to Noemí Lassiter.  Verbalized and demonstrated understanding of instructions.     PLAN:  See patient instructions  AVS printed and given to patient    FOLLOW-UP:        Time spent with patient at today's visit was 30 minutes.      Any diabetes medication dose changes were made via the CDE Protocol and Collaborative Practice Agreement with [place] and  [facility].  A copy of this encounter was provided to patient's referring provider.

## 2018-01-12 NOTE — MR AVS SNAPSHOT
After Visit Summary   1/12/2018    Noemí Lassiter    MRN: [id number]           Patient Information     Date Of Birth          1950        Visit Information        Provider Department      1/12/2018 10:15 AM Christianne Wu RN; [place] [employer] [facility] Diabetes        Today's Diagnoses     Type 2 diabetes mellitus without complication, without long-term current use of insulin (H)           Follow-ups after your visit        Your next 10 appointments already scheduled     Jan 19, 2018  3:45 PM CST   (Arrive by 3:30 PM)   Return Visit with Kota Decker MD   [facility] ([facility])    [address] [place] [address] [id number] [phone]            Feb 12, 2018 10:30 AM CST   (Arrive by 10:15 AM)   Return Visit with Analia Desai PA-C   [facility] ([facility])    [address] [place] [address] [id number] [phone]              Who to contact     Please call your clinic at [phone] to:    Ask questions about your health    Make or cancel appointments    Discuss your medicines    Learn about your test results    Speak to your doctor   If you have compliments or concerns about an experience at your clinic, or if you wish to file a complaint, please contact [facility] Patient Relations at [phone] or email us at [email or web address]         Additional Information About Your Visit        MyChart Information     [employer] is an electronic gateway that provides easy, online access to your medical records. With [employer], you can request a clinic appointment, read your test results, renew a prescription or communicate with your care team.     To sign up for [employer] visit the website at [email or web address]   You will be asked to enter the access code listed below, as well  as some personal information. Please follow the directions to create your username and password.     Your access code is: [email or web address]  Expires: 2018  5:30 AM     Your access code will  in 90 days. If you need help or a new code, please contact your [facility] or call [phone] for assistance.        Care EveryWhere ID     This is your Care EveryWhere ID. This could be used by other organizations to access your [place] medical records  [id number]         Blood Pressure from Last 3 Encounters:   17 134/66   17 148/77   10/06/17 160/76    Weight from Last 3 Encounters:   18 77.8 kg (171 lb 9.6 oz)   17 80.1 kg (176 lb 9.6 oz)   17 78.5 kg (173 lb)              We Performed the Following     DIABETES EDUCATION - Individual  []          Today's Medication Changes          These changes are accurate as of: 18 11:59 PM.  If you have any questions, ask your nurse or doctor.               These medicines have changed or have updated prescriptions.        Dose/Directions    metFORMIN 500 MG 24 hr tablet   Commonly known as:  GLUCOPHAGE-XR   This may have changed:  when to take this   Used for:  Type 2 diabetes mellitus without complication, without long-term current use of insulin (H)   Changed by:  Christianne Wu, RN        Dose:  500 mg   Take 1 tablet (500 mg) by mouth 2 times daily (with meals)   Quantity:  60 tablet   Refills:  3            Where to get your medicines      These medications were sent to [facility], [address] [address]     Phone:  [phone]     metFORMIN 500 MG 24 hr tablet                Primary Care Provider Office Phone # Fax #    Bay Miller [phone]       [address]  [facility] [address]        Equal Access to Services     OFELIA ALEXIS AH: joyce Prieto  ridgeshane apolinar rhodamiriam corona. So [facility] [phone].    ATENCIÓN: Si jacques urias, tiene a kern disposición servicios gratuitos de asistencia lingüística. Zheng al [phone].    We comply with applicable federal civil rights laws and [place] laws. We do not discriminate on the basis of race, color, national origin, age, disability, sex, sexual orientation, or gender identity.            Thank you!     Thank you for choosing [facility] DIABETES  for your care. Our goal is always to provide you with excellent care. Hearing back from our patients is one way we can continue to improve our services. Please take a few minutes to complete the written survey that you may receive in the mail after your visit with us. Thank you!             Your Updated Medication List - Protect others around you: Learn how to safely use, store and throw away your medicines at www.disposemymeds.org.          This list is accurate as of: 1/12/18 11:59 PM.  Always use your most recent med list.                   Brand Name Dispense Instructions for use Diagnosis    acetaminophen 325 MG tablet    TYLENOL     Take 650 mg by mouth        Alcohol Prep Pads     100 each    1 each 2 times daily    Type 2 diabetes mellitus without complication, without long-term current use of insulin (H)       aspirin 81 MG tablet     60 tablet    Take 1 tablet (81 mg) by mouth daily    Encounter for routine adult health examination without abnormal findings       blood glucose monitoring test strip    MANUEL CONTOUR NEXT    100 strip    Use to test blood sugar 2 times daily or as directed.  Ok to substitute alternative if insurance prefers.    Type 2 diabetes mellitus without complication, without long-term current use of insulin (H)       cyclobenzaprine 5 MG tablet    FLEXERIL    8 tablet    Take 1 tablet (5 mg) by mouth 2 times daily as needed for muscle spasms        D 2000 2000 UNITS tablet   Generic drug:   cholecalciferol      Take 2,000 Units by mouth        DAILY VITES Tabs      TAKE 1 TABLET BY MOUTH EVERY DAY        hydrochlorothiazide 12.5 MG Tabs tablet     180 tablet    Take 2 tablets (25 mg) by mouth daily    Benign essential hypertension       ibuprofen 600 MG tablet    ADVIL/MOTRIN     Take 600 mg by mouth        losartan 100 MG tablet    COZAAR    90 tablet    Take 1 tablet (100 mg) by mouth daily    Benign essential hypertension       metFORMIN 500 MG 24 hr tablet    GLUCOPHAGE-XR    60 tablet    Take 1 tablet (500 mg) by mouth 2 times daily (with meals)    Type 2 diabetes mellitus without complication, without long-term current use of insulin (H)       simvastatin 40 MG tablet    ZOCOR    30 tablet    Take 1 tablet (40 mg) by mouth At Bedtime    Hyperlipidemia, unspecified hyperlipidemia type       tamsulosin 0.4 MG capsule    FLOMAX     Take 0.4 mg by mouth        tolterodine 4 MG 24 hr capsule    DETROL LA    30 capsule    Take 1 capsule (4 mg) by mouth daily    Urge incontinence of urine       Vitamin-B Complex Tabs

## 2018-01-29 ENCOUNTER — PRE VISIT (OUTPATIENT)
Dept: UROLOGY | Facility: CLINIC | Age: 68
End: 2018-01-29

## 2018-01-29 NOTE — TELEPHONE ENCOUNTER
Patient is coming in to see Analia Desai PA-C for 1 month to review voiding diary & reassess symptoms, call and left a message to please bring bladder diary.

## 2018-09-10 ENCOUNTER — OFFICE VISIT (OUTPATIENT)
Dept: AUDIOLOGY | Facility: CLINIC | Age: 68
End: 2018-09-10
Payer: COMMERCIAL

## 2018-09-10 DIAGNOSIS — H90.3 SENSORINEURAL HEARING LOSS, BILATERAL: Primary | ICD-10-CM

## 2018-09-10 NOTE — PROGRESS NOTES
AUDIOLOGY REPORT    BACKGROUND INFORMATION: Noemí Lassiter was seen in the Audiology Clinic at the [facility] on 9/10/2018 for follow-up.  The patient has been seen previously at an outside clinic [facility]) and results revealed a bilateral sensorineural hearing loss.  The patient was fit with binaural Unitron Stride 700 half-shell hearing aids on 4/26/17, he then lost the right hearing aid and was fit with a loss and damage replacement right on 9/15/17. The patient reports that neither hearing aid is working.  He was accompanied to today's appointment by a [facility] .    TEST RESULTS AND PROCEDURES: The hearing aids were cleaned and began working again.  An electroacoustic analysis revealed that the right hearing aid was functioning appropriately while the left hearing aid was weak.  This was reviewed with Noemí.  The left hearing aid will be sent in for an in-warranty repair.    Noemí reported good sound quality with the cleaned right hearing aid. No adjustments were made to programming today.    SUMMARY AND RECOMMENDATIONS: A hearing aid check was performed today.  The right hearing aid was found to be functioning well following a cleaning but the left hearing aid remained weak.  The left hearing aid will be sent in for repair. Noemí will return for follow-up on 10/4/18 to  the repaired hearing aid and make any necessary programming adjustments.  Call this clinic with questions regarding today s visit.    Alexis Garrett  Audiologist  [place] License  #[id number]

## 2018-09-10 NOTE — MR AVS SNAPSHOT
After Visit Summary   9/10/2018    Noemí Lassiter    MRN: [id number]           Patient Information     Date Of Birth          1950        Visit Information        Provider Department      9/10/2018 1:00 PM Sanjuana Sifuentes AuD; [place] [employer] [facility] Audiology        Today's Diagnoses     Sensorineural hearing loss, bilateral    -  1       Follow-ups after your visit        Who to contact     Please call your clinic at [phone] to:    Ask questions about your health    Make or cancel appointments    Discuss your medicines    Learn about your test results    Speak to your doctor            Additional Information About Your Visit        Care EveryWhere ID     This is your Care EveryWhere ID. This could be used by other organizations to access your [place] medical records  [id number]         Blood Pressure from Last 3 Encounters:   12/11/17 134/66   11/07/17 148/77   10/06/17 160/76    Weight from Last 3 Encounters:   01/12/18 77.8 kg (171 lb 9.6 oz)   12/11/17 80.1 kg (176 lb 9.6 oz)   11/07/17 78.5 kg (173 lb)              We Performed the Following     Electro Acoustic Hearing Aid Test, Binaural (19630)        Primary Care Provider Office Phone # Fax #    Bay MD Angela [phone]       [address] [address]        Equal Access to Services     OFELIA ALEXIS AH: Hadii aad ku hadasho Soomaali, waaxda luqadaha, qaybta kaalmada adeegyada, waxay idiin hayevertonn reza valladares. So [facility] [phone].    ATENCIÓN: Si habla español, tiene a kern disposición servicios gratuitos de asistencia lingüística. Llame al [phone].    We comply with applicable federal civil rights laws and [place] laws. We do not discriminate on the basis of race, color, national origin, age, disability, sex, sexual orientation, or gender identity.            Thank you!     Thank you for choosing [facility] AUDIOLOGY  for your care. Our goal is  always to provide you with excellent care. Hearing back from our patients is one way we can continue to improve our services. Please take a few minutes to complete the written survey that you may receive in the mail after your visit with us. Thank you!             Your Updated Medication List - Protect others around you: Learn how to safely use, store and throw away your medicines at www.disposemymeds.org.          This list is accurate as of 9/10/18  1:44 PM.  Always use your most recent med list.                   Brand Name Dispense Instructions for use Diagnosis    acetaminophen 325 MG tablet    TYLENOL     Take 650 mg by mouth        Alcohol Prep Pads     100 each    1 each 2 times daily    Type 2 diabetes mellitus without complication, without long-term current use of insulin (H)       aspirin 81 MG tablet     60 tablet    Take 1 tablet (81 mg) by mouth daily    Encounter for routine adult health examination without abnormal findings       blood glucose monitoring test strip    MANUEL CONTOUR NEXT    100 strip    Use to test blood sugar 2 times daily or as directed.  Ok to substitute alternative if insurance prefers.    Type 2 diabetes mellitus without complication, without long-term current use of insulin (H)       cyclobenzaprine 5 MG tablet    FLEXERIL    8 tablet    Take 1 tablet (5 mg) by mouth 2 times daily as needed for muscle spasms        D 2000 2000 units tablet   Generic drug:  cholecalciferol      Take 2,000 Units by mouth        DAILY VITES Tabs      TAKE 1 TABLET BY MOUTH EVERY DAY        hydrochlorothiazide 12.5 MG Tabs tablet     180 tablet    Take 2 tablets (25 mg) by mouth daily    Benign essential hypertension       ibuprofen 600 MG tablet    ADVIL/MOTRIN     Take 600 mg by mouth        losartan 100 MG tablet    COZAAR    90 tablet    Take 1 tablet (100 mg) by mouth daily    Benign essential hypertension       metFORMIN 500 MG 24 hr tablet    GLUCOPHAGE-XR    60 tablet    Take 1 tablet (500  mg) by mouth 2 times daily (with meals)    Type 2 diabetes mellitus without complication, without long-term current use of insulin (H)       simvastatin 40 MG tablet    ZOCOR    30 tablet    Take 1 tablet (40 mg) by mouth At Bedtime    Hyperlipidemia, unspecified hyperlipidemia type       tamsulosin 0.4 MG capsule    FLOMAX     Take 0.4 mg by mouth        tolterodine 4 MG 24 hr capsule    DETROL LA    30 capsule    Take 1 capsule (4 mg) by mouth daily    Urge incontinence of urine       Vitamin-B Complex Tabs

## 2018-10-04 ENCOUNTER — OFFICE VISIT (OUTPATIENT)
Dept: AUDIOLOGY | Facility: CLINIC | Age: 68
End: 2018-10-04
Payer: COMMERCIAL

## 2018-10-04 DIAGNOSIS — H90.3 SENSORINEURAL HEARING LOSS, BILATERAL: Primary | ICD-10-CM

## 2018-10-04 NOTE — PROGRESS NOTES
"AUDIOLOGY REPORT    BACKGROUND INFORMATION: Noemí Lassiter was seen in the Audiology Clinic at the [facility] on 10/4/2018 for follow-up.  The patient has been seen previously at an outside clinic on 8/26/26 and results revealed a bilateral sensorineural hearing loss.  The patient was fit with Unitron Stride 700 half-shell hearing aids on 4/26/17.  He is here today to  his repaired left hearing aid. The patient reports that the right hearing aid sometimes \"makes a noise\".  No in person  was available today so a [facility]  was provided via telephone.    TEST RESULTS AND PROCEDURES: A listening check revealed that both hearing aids sounded crisp and clear with no distortion or weakness noted.  While he was inserting the hearing aids into his ears, it was noted that it was not situated quite right.  This was reviewed with Noemí, it was discussed that the sounds he is hearing may be feedback caused by not having the hearing aids in the ears completely.  Adjustments were made including setting the hearing aids to 75% and they will auto-acclimatize to 100% over 6 weeks and the patient reported good sound quality.     SUMMARY AND RECOMMENDATIONS: A hearing aid check was performed today.  Adjustments were made as noted above and the patient will return as needed or at least every 4-6 months for cleaning and assessment of hearing aid.  Call this clinic with questions regarding today s visit.      Alexis Garrett  Audiologist  [place] License  #[id number]    "

## 2019-08-01 ENCOUNTER — PRE VISIT (OUTPATIENT)
Dept: UROLOGY | Facility: CLINIC | Age: 69
End: 2019-08-01

## 2019-08-02 ENCOUNTER — TELEPHONE (OUTPATIENT)
Dept: UROLOGY | Facility: CLINIC | Age: 69
End: 2019-08-02

## 2019-08-02 NOTE — TELEPHONE ENCOUNTER
----- Message from Laura Arias CMA sent at 8/2/2019  9:47 AM CDT -----  Regarding: RE: Please move pt  That's fine, I'll have Delbert talk with him, will you document that he refused to move?    FRANCESCA  ----- Message -----  From: Brianna Haynes  Sent: 8/2/2019   9:26 AM  To: Laura Arias CMA, Amy Guillen RN  Subject: RE: Please move pt                               Hey so pt doesn't want to see Analia.  He doesn't understand why he cant see Dr. Mandujano and didn't want to change his appt.    ----- Message -----  From: Amy Guillen RN  Sent: 8/2/2019   8:51 AM  To: Brianna Haynes  Subject: RE: Please move pt                               He should go to [place] (unless he wants to see a male provider)  ----- Message -----  From: Brianna Haynes  Sent: 8/2/2019   8:41 AM  To: Amy Guillen RN  Subject: FW: Please move pt                               Hey per Dr. Velásquez AVS in 2017, pt was supposed to f/u with Analia in 1 mon with voiding diary but never did. Looks like he no showed Analia 2/2018.  Can he stay with Dr. Mandujano since we havent seen him or move him to Dr. Mandujano??    ----- Message -----  From: Laura Arias CMA  Sent: 8/1/2019   7:27 PM  To: [facility] Coordin[facility]  Subject: Please move pt                                   Noemí was supposed to follow up with Analia for his voiding symptoms. Please move him to either [place] or [place].    Thank you,  FRANCESCA

## 2019-08-02 NOTE — TELEPHONE ENCOUNTER
Reason for visit: incontinence follow up     Relevant information: pt was to follow up with Analia Desai, but did not    Records/imaging/labs/orders: records available    Pt called: Message sent to scheduling team to move pt

## 2019-08-13 ENCOUNTER — OFFICE VISIT (OUTPATIENT)
Dept: UROLOGY | Facility: CLINIC | Age: 69
End: 2019-08-13
Payer: COMMERCIAL

## 2019-08-13 VITALS
WEIGHT: 171 LBS | DIASTOLIC BLOOD PRESSURE: 72 MMHG | HEIGHT: 66 IN | SYSTOLIC BLOOD PRESSURE: 129 MMHG | HEART RATE: 67 BPM | BODY MASS INDEX: 27.48 KG/M2

## 2019-08-13 DIAGNOSIS — R39.11 URINARY HESITANCY: ICD-10-CM

## 2019-08-13 DIAGNOSIS — R39.14 FEELING OF INCOMPLETE BLADDER EMPTYING: ICD-10-CM

## 2019-08-13 DIAGNOSIS — N39.41 URGE URINARY INCONTINENCE: Primary | ICD-10-CM

## 2019-08-13 ASSESSMENT — PAIN SCALES - GENERAL: PAINLEVEL: NO PAIN (0)

## 2019-08-13 ASSESSMENT — MIFFLIN-ST. JEOR: SCORE: 1483.4

## 2019-08-13 NOTE — NURSING NOTE
Chief Complaint   Patient presents with     RECHECK     incontinence follow up        Cesilia Brunner MA

## 2019-08-13 NOTE — PROGRESS NOTES
"Urology Office Visit - Follow-up    Reason for visit: urinary incontinence    HPI: Mr. Noemí Lassiter is a 69 year old male with PMH notable for prostatic enlargement, DM, cervical stenosis, urge urinary incontinence, HLD, and HTN who returns to the urology clinic for follow up of urinary incontinence. Last visit with Dr. Mandujano on 12/11/17. HPI from that date as follows:     The patient has previously been seen with Dr. Leach at [facility] for symptoms of dysuria, urgency, & retention in 2012.  At that time, he was started on Proscar & doxazosin with instructions to follow up in one years' time.  He has had his medications refilled since then but does not appear to have followed up.     In discussion with the patient about his incontinence, he thinks this has been going on since 2012.  He is currently taking tamsulosin but notes that this is not helping.  He has incontinence every day.  He is not wearing a pad or diaper.  He does not feel as though he is able to empty his bladder.  He thinks he is able to \"empty half.\"  He thinks that his urine stream is quite slow.  Denies gross hematuria or urinary tract infections.  Denies trauma to the penis or perineum.  He does have a remote history of a back injury.  Has some symptoms consistent with urge incontinence.  Has nocturia and also has urine leakage at night.       He denies previous surgeries to the penis, urethra, bladder, prostate, and kidneys.  Previous PSAs reviewed: 2.8 done in 2012 at [facility] and 1.7 in December 2014 (unsure if December 2014 value was on Proscar).    Flow/PVR on that date showed Qmax 21.1 ml/s, voided volume 276 mL, bell-shape flow curve, and PVR 56 mL. He was therefore started on Detrol, recommended to complete voiding diaries, and follow up in 1 month. He was then lost to follow up.    Returns to clinic today complaining of the same symptoms as summarized above. There has been no change. Continues to have daily urge incontinence as well as " "urinary hesitancy and feeling of incomplete bladder emptying. He states that he did take Detrol in 2017 for 5-6 months but it did not help so he stopped it. Unclear if he is still taking Flomax.    PEx  /72   Pulse 67   Ht 1.676 m (5' 6\")   Wt 77.6 kg (171 lb)   BMI 27.60 kg/m    GEN: well-appearing male in NAD  RESP: no increased respiratory effort    Urinary Flow Rate  Peak Flow: 12.7 mL/s  Average Flow: 8.2 mL/s  Voided (mL): 203 mL  Residual Volume by Ultrasound: 71 mL  Character of Curve: Bell Shape    A/P   69 year old male with urge urinary incontinence as well as urinary hesitancy and sensation of incomplete bladder emptying.     Uroflowmetry studies today show reduced Qmax of 12.7 ml/s (much slower compared to Qmax 21.1 ml/s in 2017), continuous bell-shape flow curve, and adequate bladder emptying (PVR 71 mL). It is unclear whether he is still taking Flomax. Given his chronic mixed LUTS with worsening Qmax and obstructive symptoms, we discussed management options including further evaluation with urodynamics and cystoscopy if UDS suggests outlet obstruction, versus trial of another anticholinergic medication or mirabegron since he does empty satisfactorily (though this could worsen obstructive symptoms), versus resuming Flomax (though it is unclear if he is already taking this). He is significantly bothered by his symptoms and as he has previously failed medication therapy, he elects for additional testing. He would be willing to consider an outlet procedure if obstruction is confirmed.  -Schedule urodynamics testing next available.  -If UDS suggestive of obstruction, follow up with Dr. Welch or Dr. Jones for cystoscopy and consideration for possible outlet procedure.   -If UDS not suggestive for obstruction but shows DO, consider trial of another antilcholinergic medication or mirabegron vs PTNS.  -Patient instructed to bring all of his meds to next appointment to confirm whether he is " taking Flomax or not.     The entirety of today's visit was conducted with the aid of a professional .     25 minutes spent with the patient, >50% in counseling and coordination of care.    Analia Desai PA-C  Department of Urology

## 2019-08-13 NOTE — PATIENT INSTRUCTIONS
UROLOGY CLINIC VISIT PATIENT INSTRUCTIONS    1) Schedule urodynamics testing next available.    2) Schedule cystoscopy with Dr. Welch after the urodynamics testing (if same day is possible, that would be preferred).    If you have any issues, questions or concerns in the meantime, do not hesitate to contact us at [phone] or via [email or web address].     It was a pleasure meeting with you today.  Thank you for allowing me and my team the privilege of caring for you today.  YOU are the reason we are here, and I truly hope we provided you with the excellent service you deserve.  Please let us know if there is anything else we can do for you so that we can be sure you are leaving completely satisfied with your care experience.
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
## 2019-08-23 ENCOUNTER — PRE VISIT (OUTPATIENT)
Dept: UROLOGY | Facility: CLINIC | Age: 69
End: 2019-08-23

## 2019-08-29 ENCOUNTER — TELEPHONE (OUTPATIENT)
Dept: UROLOGY | Facility: CLINIC | Age: 69
End: 2019-08-29

## 2019-08-29 NOTE — TELEPHONE ENCOUNTER
[facility] Call Center    Phone Message    May a detailed message be left on voicemail: yes    Reason for Call: Other: Per call from Alannah PT has questions re: upcoming procedure with SUNI Mejia on 9/3. Please reach out to Alannah at the above #.      Action Taken: Message routed to:  [facility] ([facility] Urology

## 2019-08-30 NOTE — TELEPHONE ENCOUNTER
Again called twice yesterday with no answer  Left message x1    Called today and left message Haritha Castellano LPN Staff Nurse

## 2019-09-03 ENCOUNTER — PRE VISIT (OUTPATIENT)
Dept: UROLOGY | Facility: CLINIC | Age: 69
End: 2019-09-03

## 2019-09-03 NOTE — TELEPHONE ENCOUNTER
Reason for Visit: Cystoscopy, UDS prior    Diagnosis: Urinary incontinence    Orders/Procedures/Records: Records available, UDS (9/3)    Contact Patient: N/A    Rooming Requirements: Cystoscopy with urine      Shani Rossi  09/03/19  7:33 AM

## 2019-09-19 ENCOUNTER — DOCUMENTATION ONLY (OUTPATIENT)
Dept: CARE COORDINATION | Facility: CLINIC | Age: 69
End: 2019-09-19

## 2019-09-24 ENCOUNTER — OFFICE VISIT (OUTPATIENT)
Dept: UROLOGY | Facility: CLINIC | Age: 69
End: 2019-09-24
Payer: COMMERCIAL

## 2019-09-24 VITALS
SYSTOLIC BLOOD PRESSURE: 131 MMHG | BODY MASS INDEX: 27.32 KG/M2 | WEIGHT: 170 LBS | HEART RATE: 78 BPM | HEIGHT: 66 IN | DIASTOLIC BLOOD PRESSURE: 62 MMHG

## 2019-09-24 DIAGNOSIS — R39.9 LOWER URINARY TRACT SYMPTOMS: ICD-10-CM

## 2019-09-24 DIAGNOSIS — N39.41 URGE URINARY INCONTINENCE: Primary | ICD-10-CM

## 2019-09-24 DIAGNOSIS — Z12.5 SCREENING PSA (PROSTATE SPECIFIC ANTIGEN): ICD-10-CM

## 2019-09-24 DIAGNOSIS — N42.9 DISORDER OF PROSTATE: ICD-10-CM

## 2019-09-24 RX ORDER — ASPIRIN 81 MG/1
TABLET ORAL
Refills: 3 | COMMUNITY
Start: 2019-09-02 | End: 2022-11-18

## 2019-09-24 RX ORDER — LIDOCAINE HYDROCHLORIDE 20 MG/ML
JELLY TOPICAL ONCE
Status: COMPLETED | OUTPATIENT
Start: 2019-09-24 | End: 2019-09-24

## 2019-09-24 RX ADMIN — LIDOCAINE HYDROCHLORIDE: 20 JELLY TOPICAL at 13:34

## 2019-09-24 ASSESSMENT — PAIN SCALES - GENERAL: PAINLEVEL: NO PAIN (0)

## 2019-09-24 ASSESSMENT — MIFFLIN-ST. JEOR: SCORE: 1478.86
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
## 2019-09-24 NOTE — PATIENT INSTRUCTIONS
Schedule urodynamics with fluoro.  PSA prior to UDS.  Follow up with Dr. Welch to review the results.        It was a pleasure meeting with you today.  Thank you for allowing me and my team the privilege of caring for you today.  YOU are the reason we are here, and I truly hope we provided you with the excellent service you deserve.  Please let us know if there is anything else we can do for you so that we can be sure you are leaving completely satisfied with your care experience.

## 2019-09-24 NOTE — NURSING NOTE
"Return-  Pt no-showed his UDS  Hx of BPH and Urge incontinence   He states he as not been taking any medications for the bladder, associated with a weak stream and dysuria and leaking and one episode of hematuria ~4 Mo ago.      Chief Complaint   Patient presents with     RECHECK     BPH/ Urinary Urge        Blood pressure 131/62, pulse 78, height 1.676 m (5' 6\"), weight 77.1 kg (170 lb). Body mass index is 27.44 kg/m .    Patient Active Problem List   Diagnosis     Sensory hearing loss, bilateral     Swelling of limb     Hyperlipidemia, unspecified hyperlipidemia type     Type 2 diabetes mellitus without complication, without long-term current use of insulin (H)     Benign essential hypertension     Bullous keratopathy     Cervical stenosis of spinal canal     Lumbar canal stenosis     Chronic low back pain     Chronic pain     Dysuria     Hypertension     Lumbar foraminal stenosis     Neck pain     Urinary incontinence, urge       No Known Allergies    Current Outpatient Medications   Medication Sig Dispense Refill     acetaminophen (TYLENOL) 325 MG tablet Take 650 mg by mouth       Alcohol Swabs (ALCOHOL PREP) PADS 1 each 2 times daily 100 each 3     ASPIR-LOW 81 MG EC tablet   3     aspirin 81 MG tablet Take 1 tablet (81 mg) by mouth daily 60 tablet 0     B Complex Vitamins (VITAMIN-B COMPLEX) TABS        blood glucose monitoring (MANUEL CONTOUR NEXT) test strip Use to test blood sugar 2 times daily or as directed.  Ok to substitute alternative if insurance prefers. 100 strip prn     cholecalciferol (D 2000) 2000 UNITS tablet Take 2,000 Units by mouth       cyclobenzaprine (FLEXERIL) 5 MG tablet Take 1 tablet (5 mg) by mouth 2 times daily as needed for muscle spasms 8 tablet 0     hydrochlorothiazide 12.5 MG TABS tablet Take 2 tablets (25 mg) by mouth daily 180 tablet 3     ibuprofen (ADVIL/MOTRIN) 600 MG tablet Take 600 mg by mouth       losartan (COZAAR) 100 MG tablet Take 1 tablet (100 mg) by mouth daily 90 " tablet 0     metFORMIN (GLUCOPHAGE-XR) 500 MG 24 hr tablet Take 1 tablet (500 mg) by mouth 2 times daily (with meals) 60 tablet 3     Multiple Vitamin (DAILY VITES) TABS TAKE 1 TABLET BY MOUTH EVERY DAY       simvastatin (ZOCOR) 40 MG tablet Take 1 tablet (40 mg) by mouth At Bedtime 30 tablet      tamsulosin (FLOMAX) 0.4 MG capsule Take 0.4 mg by mouth         Social History     Tobacco Use     Smoking status: Never Smoker     Smokeless tobacco: Never Used   Substance Use Topics     Alcohol use: No     Drug use: No       Invasive Procedure Safety Checklist:    Procedure: Cystoscopy    Action: Complete sections and checkboxes as appropriate.    Pre-procedure:  1. Patient ID Verified with 2 identifiers (Carla and  or MRN) : YES    2. Procedure and site verified with patient/designee (when able) : YES    3. Accurate consent documentation in medical record : YES    4. H&P (or appropriate assessment) documented in medical record : N/A  H&P must be up to 30 days prior to procedure an updated within 24 hours of                 Procedure as applicable.     5. Relevant diagnostic and radiology test results appropriately labeled and displayed as applicable : YES    6. Blood products, implants, devices, and/or special equipment available for the procedure as applicable : YES    7. Procedure site(s) marked with provider initials [Exclusions: none] : NO    8. Marking not required. Reason : Yes  Procedure does not require site marking    Time Out:     Time-Out performed immediately prior to starting procedure, including verbal and active participation of all team members addressing: YES    1. Correct patient identity.  2. Confirmed that the correct side and site are marked.  3. An accurate procedure to be done.  4. Agreement on the procedure to be done.  5. Correct patient position.  6. Relevant images and results are properly labeled and appropriately displayed.  7. The need to administer antibiotics or fluids for irrigation  purposes during the procedure as applicable.  8. Safety precautions based on patient history or medication use.    During Procedure: Verification of correct person, site, and procedure occurs any time the responsibility for care of the patient is transferred to another member of the care team.    The following medication was given:     MEDICATION: Lidocaine Uro-Jet 2% 200mg (20mg/mL)  ROUTE: Urethral   SITE: Urethra   DOSE: 10mL  LOT #: [id number]  : [employer]   EXPIRATION DATE: 6-21    NDC#: [address]-37   Was there drug waste? No    Prior to injection, verified patient identity using patient's name and date of birth.  Due to injection administration, patient instructed to remain in clinic for 15 minutes  afterwards, and to report any adverse reaction to me immediately.    Drug Amount Wasted:  None.  Vial/Syringe: Single dose vial      Spenser Lea, EMT  9/24/2019  1:33 PM

## 2019-11-04 ENCOUNTER — PRE VISIT (OUTPATIENT)
Dept: UROLOGY | Facility: CLINIC | Age: 69
End: 2019-11-04

## 2020-08-07 ENCOUNTER — APPOINTMENT (OUTPATIENT)
Dept: INTERPRETER SERVICES | Facility: CLINIC | Age: 70
End: 2020-08-07
Payer: COMMERCIAL

## 2020-08-07 ENCOUNTER — HOSPITAL ENCOUNTER (EMERGENCY)
Facility: CLINIC | Age: 70
Discharge: HOME | End: 2020-08-07
Attending: EMERGENCY MEDICINE | Admitting: EMERGENCY MEDICINE
Payer: COMMERCIAL

## 2020-08-07 ENCOUNTER — APPOINTMENT (OUTPATIENT)
Dept: GENERAL RADIOLOGY | Facility: CLINIC | Age: 70
End: 2020-08-07
Attending: EMERGENCY MEDICINE
Payer: COMMERCIAL

## 2020-08-07 VITALS
TEMPERATURE: 97.7 F | HEART RATE: 69 BPM | DIASTOLIC BLOOD PRESSURE: 59 MMHG | SYSTOLIC BLOOD PRESSURE: 125 MMHG | RESPIRATION RATE: 16 BRPM | OXYGEN SATURATION: 100 %

## 2020-08-07 DIAGNOSIS — R05.9 COUGH: ICD-10-CM

## 2020-08-07 LAB
ALBUMIN SERPL-MCNC: 4 G/DL (ref 3.4–5)
ALP SERPL-CCNC: 51 U/L (ref 40–150)
ALT SERPL W P-5'-P-CCNC: 23 U/L (ref 0–70)
ANION GAP SERPL CALCULATED.3IONS-SCNC: 8 MMOL/L (ref 3–14)
AST SERPL W P-5'-P-CCNC: 34 U/L (ref 0–45)
BASOPHILS # BLD AUTO: 0.1 10E9/L (ref 0–0.2)
BASOPHILS NFR BLD AUTO: 0.8 %
BILIRUB SERPL-MCNC: 0.4 MG/DL (ref 0.2–1.3)
BUN SERPL-MCNC: 19 MG/DL (ref 7–30)
CALCIUM SERPL-MCNC: 9.9 MG/DL (ref 8.5–10.1)
CHLORIDE SERPL-SCNC: 103 MMOL/L (ref 94–109)
CO2 SERPL-SCNC: 27 MMOL/L (ref 20–32)
CREAT SERPL-MCNC: 0.93 MG/DL (ref 0.66–1.25)
DIFFERENTIAL METHOD BLD: NORMAL
EOSINOPHIL # BLD AUTO: 0.4 10E9/L (ref 0–0.7)
EOSINOPHIL NFR BLD AUTO: 4.7 %
ERYTHROCYTE [DISTWIDTH] IN BLOOD BY AUTOMATED COUNT: 12.1 % (ref 10–15)
GFR SERPL CREATININE-BSD FRML MDRD: 82 ML/MIN/{1.73_M2}
GLUCOSE SERPL-MCNC: 100 MG/DL (ref 70–99)
HCT VFR BLD AUTO: 42.1 % (ref 40–53)
HGB BLD-MCNC: 14.6 G/DL (ref 13.3–17.7)
IMM GRANULOCYTES # BLD: 0 10E9/L (ref 0–0.4)
IMM GRANULOCYTES NFR BLD: 0.3 %
LYMPHOCYTES # BLD AUTO: 4 10E9/L (ref 0.8–5.3)
LYMPHOCYTES NFR BLD AUTO: 53.1 %
MCH RBC QN AUTO: 30.7 PG (ref 26.5–33)
MCHC RBC AUTO-ENTMCNC: 34.7 G/DL (ref 31.5–36.5)
MCV RBC AUTO: 88 FL (ref 78–100)
MONOCYTES # BLD AUTO: 0.7 10E9/L (ref 0–1.3)
MONOCYTES NFR BLD AUTO: 8.8 %
NEUTROPHILS # BLD AUTO: 2.4 10E9/L (ref 1.6–8.3)
NEUTROPHILS NFR BLD AUTO: 32.3 %
NRBC # BLD AUTO: 0 10*3/UL
NRBC BLD AUTO-RTO: 0 /100
NT-PROBNP SERPL-MCNC: 48 PG/ML (ref 0–900)
PLATELET # BLD AUTO: 170 10E9/L (ref 150–450)
POTASSIUM SERPL-SCNC: 4.1 MMOL/L (ref 3.4–5.3)
PROT SERPL-MCNC: 8.8 G/DL (ref 6.8–8.8)
RBC # BLD AUTO: 4.76 10E12/L (ref 4.4–5.9)
SODIUM SERPL-SCNC: 138 MMOL/L (ref 133–144)
WBC # BLD AUTO: 7.5 10E9/L (ref 4–11)

## 2020-08-07 PROCEDURE — C9803 HOPD COVID-19 SPEC COLLECT: HCPCS | Performed by: EMERGENCY MEDICINE

## 2020-08-07 PROCEDURE — 99284 EMERGENCY DEPT VISIT MOD MDM: CPT | Mod: Z6 | Performed by: EMERGENCY MEDICINE

## 2020-08-07 PROCEDURE — 71045 X-RAY EXAM CHEST 1 VIEW: CPT

## 2020-08-07 PROCEDURE — 99284 EMERGENCY DEPT VISIT MOD MDM: CPT | Mod: 25 | Performed by: EMERGENCY MEDICINE

## 2020-08-07 PROCEDURE — 85025 COMPLETE CBC W/AUTO DIFF WBC: CPT | Performed by: EMERGENCY MEDICINE

## 2020-08-07 PROCEDURE — 83880 ASSAY OF NATRIURETIC PEPTIDE: CPT | Performed by: EMERGENCY MEDICINE

## 2020-08-07 PROCEDURE — U0003 INFECTIOUS AGENT DETECTION BY NUCLEIC ACID (DNA OR RNA); SEVERE ACUTE RESPIRATORY SYNDROME CORONAVIRUS 2 (SARS-COV-2) (CORONAVIRUS DISEASE [COVID-19]), AMPLIFIED PROBE TECHNIQUE, MAKING USE OF HIGH THROUGHPUT TECHNOLOGIES AS DESCRIBED BY CMS-2020-01-R: HCPCS | Performed by: EMERGENCY MEDICINE

## 2020-08-07 PROCEDURE — 80053 COMPREHEN METABOLIC PANEL: CPT | Performed by: EMERGENCY MEDICINE

## 2020-08-07 ASSESSMENT — ENCOUNTER SYMPTOMS
SORE THROAT: 0
FEVER: 0
DIARRHEA: 0
ABDOMINAL PAIN: 0
VOMITING: 0
RHINORRHEA: 0
COUGH: 1
SHORTNESS OF BREATH: 1

## 2020-08-07 NOTE — ED AVS SNAPSHOT
[facility], [place], Emergency Department  [address]  [facility] [address]  Phone:  [phone]  Fax:  [phone]                                    Noemí Lassiter   MRN: [id number]    Department:  [facility], Emergency Department   Date of Visit:  8/7/2020           After Visit Summary Signature Page    I have received my discharge instructions, and my questions have been answered. I have discussed any challenges I see with this plan with the nurse or doctor.    ..........................................................................................................................................  Patient/Patient Representative Signature      ..........................................................................................................................................  Patient Representative Print Name and Relationship to Patient    ..................................................               ................................................  Date                                   Time    ..........................................................................................................................................  Reviewed by Signature/Title    ...................................................              ..............................................  Date                                               Time          22EPIC Rev 08/18

## 2020-08-07 NOTE — DISCHARGE INSTRUCTIONS
"Please make an appointment to follow up with Your Primary Care Provider in 5-10 days for ongoing care. Check \"my chart\" for your COVID results or call your primary care clinic for the results.     "

## 2020-08-07 NOTE — ED PROVIDER NOTES
[facility] EMERGENCY DEPARTMENT ([facility])     August 7, 2020  History     Chief Complaint   Patient presents with     Cough     sore throat     HPI  Noemí Lassiter is a 70 year old male with a medical history significant for BPH, DM 2, HLD, HTN, and bilateral hearing loss who presents to the ED today complaining of a cough.  Patient is complaining of a cough that has had for the past 5 to 6 months.  Patient also reports that he has shortness of breath when lying down, and sometimes wakes up gasping for air, which he has had for the past 5 to 6 months as well.  Patient reports he has not seen his primary doctor about these symptoms, but his doctor told him he should come to the ED and rule out COVID-19 before seeing him.  Patient denies any fever, sore throat, rhinorrhea, chest pain, vomiting, diarrhea, or any abdominal pain.    I have reviewed the Medications, Allergies, Past Medical and Surgical History, and Social History in the [facility] system.  PAST MEDICAL HISTORY:   Past Medical History:   Diagnosis Date     BPH (benign prostatic hyperplasia)      DM (diabetes mellitus), type 2 (H)      HLD (hyperlipidemia)      HTN (hypertension)        PAST SURGICAL HISTORY:   Past Surgical History:   Procedure Laterality Date     C PROSTHETIC EYE OTHER TYPE Right     In [place]      CATARACT IOL, RT/LT Left        Past medical history, past surgical history, medications, and allergies were reviewed with the patient. Additional pertinent items: None    FAMILY HISTORY:   Family History   Problem Relation Age of Onset     Eye Surgery Paternal Grandfather        SOCIAL HISTORY:   Social History     Tobacco Use     Smoking status: Never Smoker     Smokeless tobacco: Never Used   Substance Use Topics     Alcohol use: No     Social history was reviewed with the patient. Additional pertinent items: None      Patient's Medications   New Prescriptions    No medications on file   Previous Medications    ACETAMINOPHEN (TYLENOL)  325 MG TABLET    Take 650 mg by mouth    ALCOHOL SWABS (ALCOHOL PREP) PADS    1 each 2 times daily    ASPIR-LOW 81 MG EC TABLET        ASPIRIN 81 MG TABLET    Take 1 tablet (81 mg) by mouth daily    B COMPLEX VITAMINS (VITAMIN-B COMPLEX) TABS        BLOOD GLUCOSE MONITORING (MANUEL CONTOUR NEXT) TEST STRIP    Use to test blood sugar 2 times daily or as directed.  Ok to substitute alternative if insurance prefers.    CHOLECALCIFEROL (D 2000) 2000 UNITS TABLET    Take 2,000 Units by mouth    HYDROCHLOROTHIAZIDE 12.5 MG TABS TABLET    Take 2 tablets (25 mg) by mouth daily    IBUPROFEN (ADVIL/MOTRIN) 600 MG TABLET    Take 600 mg by mouth    LOSARTAN (COZAAR) 100 MG TABLET    Take 1 tablet (100 mg) by mouth daily    METFORMIN (GLUCOPHAGE-XR) 500 MG 24 HR TABLET    Take 1 tablet (500 mg) by mouth 2 times daily (with meals)    MULTIPLE VITAMIN (DAILY VITES) TABS    TAKE 1 TABLET BY MOUTH EVERY DAY    SIMVASTATIN (ZOCOR) 40 MG TABLET    Take 1 tablet (40 mg) by mouth At Bedtime    TAMSULOSIN (FLOMAX) 0.4 MG CAPSULE    Take 0.4 mg by mouth   Modified Medications    No medications on file   Discontinued Medications    CYCLOBENZAPRINE (FLEXERIL) 5 MG TABLET    Take 1 tablet (5 mg) by mouth 2 times daily as needed for muscle spasms        No Known Allergies     Review of Systems   Constitutional: Negative for fever.   HENT: Negative for rhinorrhea and sore throat.    Respiratory: Positive for cough and shortness of breath (when laying down).    Cardiovascular: Negative for chest pain.   Gastrointestinal: Negative for abdominal pain, diarrhea and vomiting.   All other systems reviewed and are negative.    A complete review of systems was performed with pertinent positives and negatives noted in the HPI, and all other systems negative.    Physical Exam   BP: 135/69  Pulse: 75  Heart Rate: 72  Temp: 98.2  F (36.8  C)  Resp: 16  SpO2: 100 %      Physical Exam    GEN:  Alert, well developed, no acute distress  HEENT:  PERRL, EOMI,  Mucous membranes are moist.   Cardio:  RRR, no murmur, radial pulses equal bilaterally  PULM:  Lungs clear, good air movement, no wheezes, rales   Abd:  Soft, normal bowel sounds, no focal tenderness  Back exam:  No CVA tenderness  Musculoskeletal:  normal range of motion, no lower extremity swelling or calf tenderness  Neuro:  Alert and oriented X3, Follows commands, moving all extremities spontaneously   Skin:  Warm, dry   ED Course   3:09 PM  The patient was seen and examined by Chelsey Ruiz MD in Room ED06.        Procedures           Labs are shown below.  Chest x-ray was reviewed by me and results are shown below.  COVID test is still pending at this time.    Results for orders placed or performed during the hospital encounter of 08/07/20 (from the past 24 hour(s))   CBC with platelets differential   Result Value Ref Range    WBC 7.5 4.0 - 11.0 10e9/L    RBC Count 4.76 4.4 - 5.9 10e12/L    Hemoglobin 14.6 13.3 - 17.7 g/dL    Hematocrit 42.1 40.0 - 53.0 %    MCV 88 78 - 100 fl    MCH 30.7 26.5 - 33.0 pg    MCHC 34.7 31.5 - 36.5 g/dL    RDW 12.1 10.0 - 15.0 %    Platelet Count 170 150 - 450 10e9/L    Diff Method Automated Method     % Neutrophils 32.3 %    % Lymphocytes 53.1 %    % Monocytes 8.8 %    % Eosinophils 4.7 %    % Basophils 0.8 %    % Immature Granulocytes 0.3 %    Nucleated RBCs 0 0 /100    Absolute Neutrophil 2.4 1.6 - 8.3 10e9/L    Absolute Lymphocytes 4.0 0.8 - 5.3 10e9/L    Absolute Monocytes 0.7 0.0 - 1.3 10e9/L    Absolute Eosinophils 0.4 0.0 - 0.7 10e9/L    Absolute Basophils 0.1 0.0 - 0.2 10e9/L    Abs Immature Granulocytes 0.0 0 - 0.4 10e9/L    Absolute Nucleated RBC 0.0    Comprehensive metabolic panel   Result Value Ref Range    Sodium 138 133 - 144 mmol/L    Potassium 4.1 3.4 - 5.3 mmol/L    Chloride 103 94 - 109 mmol/L    Carbon Dioxide 27 20 - 32 mmol/L    Anion Gap 8 3 - 14 mmol/L    Glucose 100 (H) 70 - 99 mg/dL    Urea Nitrogen 19 7 - 30 mg/dL    Creatinine 0.93 0.66 - 1.25 mg/dL     GFR Estimate 82 >60 mL/min/[1.73_m2]    GFR Estimate If Black >90 >60 mL/min/[1.73_m2]    Calcium 9.9 8.5 - 10.1 mg/dL    Bilirubin Total 0.4 0.2 - 1.3 mg/dL    Albumin 4.0 3.4 - 5.0 g/dL    Protein Total 8.8 6.8 - 8.8 g/dL    Alkaline Phosphatase 51 40 - 150 U/L    ALT 23 0 - 70 U/L    AST 34 0 - 45 U/L   Nt probnp inpatient   Result Value Ref Range    N-Terminal Pro BNP Inpatient 48 0 - 900 pg/mL   XR Chest Port 1 View    Narrative    CHEST PORTABLE ONE VIEW   8/7/2020 5:09 PM     HISTORY: Cough X 5 months, SOB with lying down.    COMPARISON: Chest x-ray on 11/7/2017      Impression    IMPRESSION: Single portable AP view of the chest was obtained. Stable  cardiomediastinal silhouette. No suspicious focal pulmonary opacities.  No significant pleural effusion or pneumothorax.     Medications - No data to display          Assessments & Plan (with Medical Decision Making)   Patient presents with a cough that he has had for 5 to 6 months as well as shortness of breath with lying down also for 5 to 6 months.  He is primarily here because he wants to have a COVID test.  I was concerned about heart failure as a cause of his symptoms, but his chest x-ray is clear and his BNP is normal, labs are unremarkable.  No sign of pneumonia, renal failure, pneumothorax or other cause for his symptoms.  Patient was advised to follow-up with his primary care physician for his ongoing symptoms.  He is not hypoxic, has normal blood pressure, heart rate, no fever and is stable for outpatient management.    I have reviewed the nursing notes.    I have reviewed the findings, diagnosis, plan and need for follow up with the patient.    New Prescriptions    No medications on file       Final diagnoses:   Cough - X 5 months   IMorgan, am serving as a trained medical scribe to document services personally performed by Chelsey Ruiz MD, based on the provider's statements to me.     I, Chelsey Ruiz MD, was  physically present and have reviewed and verified the accuracy of this note documented by Morgan Meehan.      8/7/2020   [facility], [place], EMERGENCY DEPARTMENT     Chelsey Ruiz MD  08/07/20 [id number]

## 2020-08-08 LAB
SARS-COV-2 RNA SPEC QL NAA+PROBE: NOT DETECTED
SPECIMEN SOURCE: NORMAL

## 2020-08-10 NOTE — PROGRESS NOTES
Pt spouse Karime states pt needs  and inquires if she can enter office with him to translate please call back at [phone]. Pt scheduled 10/13.   SUBJECTIVE:   Noemí Lassiter is a 67 year old year old male with history of DMII, HTN, HLD, LE edema last seen by me on 5/4 who presents for follow up of diabetes and blood pressure.    Patient had stopped taking his metformin and glipizide at the time and had not had his a1c checked since 11/2015. Recheck a1c 7.8. Patient's BP uncontrolled and HCTZ was increased from 12.5 to 25 mg. Patient has not noted side-effects. Rechecked lipids which were well-controlled on simvastatin. Otherwise feels well. States that his pharmacy has told him that he needs a prescription for alcohol wipes.    Review of systems:  10 point ROS negative except as noted above.    Past Medical History:   Diagnosis Date     BPH (benign prostatic hyperplasia)      DM (diabetes mellitus), type 2 (H)      HLD (hyperlipidemia)      HTN (hypertension)           Current Outpatient Prescriptions on File Prior to Visit:  hydrochlorothiazide 12.5 MG TABS tablet Take 2 tablets (25 mg) by mouth daily   aspirin 81 MG tablet Take 1 tablet (81 mg) by mouth daily   metFORMIN (GLUCOPHAGE) 1000 MG tablet Take 1 tablet (1,000 mg) by mouth 2 times daily (with meals)   glipiZIDE (GLUCOTROL) 10 MG tablet Take 1 tablet (10 mg) by mouth daily   simvastatin (ZOCOR) 40 MG tablet Take 1 tablet (40 mg) by mouth At Bedtime   cetirizine (ZYRTEC) 10 MG tablet Take 1 tablet (10 mg) by mouth daily as needed for allergies   losartan (COZAAR) 100 MG tablet Take 1 tablet (100 mg) by mouth daily   acetaminophen (TYLENOL) 325 MG tablet Take 650 mg by mouth   aspirin EC 81 MG EC tablet Take 81 mg by mouth   capsaicin (ZOSTRIX) 0.025 % CREA cream    Multiple Vitamin (DAILY VITES) TABS TAKE 1 TABLET BY MOUTH EVERY DAY   ibuprofen (ADVIL/MOTRIN) 600 MG tablet Take 600 mg by mouth   tamsulosin (FLOMAX) 0.4 MG capsule Take 0.4 mg by mouth   B Complex Vitamins (VITAMIN-B COMPLEX) TABS    cholecalciferol (D 2000) 2000 UNITS tablet Take 2,000 Units by mouth     No current  facility-administered medications on file prior to visit.      OBJECTIVE:  Physical exam:  /76 (BP Location: Right arm, Patient Position: Chair, Cuff Size: Adult Regular)  Pulse 68  Wt 78.9 kg (174 lb)  BMI 28.03 kg/m2  Body mass index is 28.03 kg/(m^2).   Gen: Pleasant, well-developed, well-nourished and in no apparent distress  HEENT: normocephalic, atraumatic, EOMI, no scleral icterus  CV: regular rate and rhythm, normal S1 S2, no S3 or S4 and no murmur, click, or rub  Resp: clear to ausculation bilaterally, no increased WOB  Ext: WWP, 1+ LE edema to mid leg  Skin: warm and dry, no rashes or ecchymoses on exposed skin  Neuro: alert and oriented, normal gait  Psych: normal mood, normal affect    ASSESSMENT and PLAN:     Noemí was seen today for recheck medication.    Diagnoses and all orders for this visit:    Benign essential hypertension  BP well-controlled on 25 mg HCTZ and 100 mg losartan. Plan to continue. Will check BMP today to evaluate potassium given recent dose change.  -     Basic metabolic panel; Future    Type 2 diabetes mellitus without complication, without long-term current use of insulin (H)  Hgb a1c 7.8, needs better control. Will titrate metformin and have patient follow up in 1 month with fasting glucose. If still needs better control, would increase to 1000 mg daily. Regardless, should return to see me in 2 months with fasting glucose. Needs nutrition referral, does not recall ever having a discussion about healthy diet.  -     metFORMIN (GLUCOPHAGE-XR) 500 MG 24 hr tablet; Take 1 tablet (500 mg) by mouth daily (with dinner)  -     ALCOHOL WIPES PER BOX  -     TSH with free T4 reflex; Future  -     NUTRITION REFERRAL  -     Glucose; Future    Return to clinic in 1 month    Patient seen and discussed with Dr. Vásquez who agrees with this plan.    Kota Miller MD  Internal Medicine, PGY-1  Pager [phone]    Pt was seen and examined with Dr. Miller.  I agree with his documentation as  noted above.    My additional comments: None    Ralph Vásquez MD

## 2020-08-21 ENCOUNTER — HOSPITAL ENCOUNTER (EMERGENCY)
Facility: CLINIC | Age: 70
Discharge: HOME | End: 2020-08-21
Payer: COMMERCIAL

## 2021-03-08 ENCOUNTER — APPOINTMENT (OUTPATIENT)
Dept: INTERPRETER SERVICES | Facility: CLINIC | Age: 71
End: 2021-03-08
Payer: COMMERCIAL

## 2022-11-17 ENCOUNTER — HOSPITAL ENCOUNTER (OUTPATIENT)
Facility: CLINIC | Age: 72
Setting detail: OBSERVATION
Discharge: HOME | End: 2022-11-19
Attending: EMERGENCY MEDICINE | Admitting: STUDENT IN AN ORGANIZED HEALTH CARE EDUCATION/TRAINING PROGRAM
Payer: COMMERCIAL

## 2022-11-17 ENCOUNTER — APPOINTMENT (OUTPATIENT)
Dept: CT IMAGING | Facility: CLINIC | Age: 72
End: 2022-11-17
Attending: EMERGENCY MEDICINE
Payer: COMMERCIAL

## 2022-11-17 ENCOUNTER — APPOINTMENT (OUTPATIENT)
Dept: GENERAL RADIOLOGY | Facility: CLINIC | Age: 72
End: 2022-11-17
Attending: EMERGENCY MEDICINE
Payer: COMMERCIAL

## 2022-11-17 DIAGNOSIS — R07.9 CHEST PAIN, UNSPECIFIED TYPE: ICD-10-CM

## 2022-11-17 DIAGNOSIS — U07.1 LAB TEST POSITIVE FOR DETECTION OF COVID-19 VIRUS: ICD-10-CM

## 2022-11-17 DIAGNOSIS — R55 SYNCOPE, UNSPECIFIED SYNCOPE TYPE: ICD-10-CM

## 2022-11-17 DIAGNOSIS — U07.1 INFECTION DUE TO 2019 NOVEL CORONAVIRUS: ICD-10-CM

## 2022-11-17 DIAGNOSIS — R51.9 FACIAL PAIN: ICD-10-CM

## 2022-11-17 LAB
ALBUMIN SERPL-MCNC: 3.5 G/DL (ref 3.4–5)
ALP SERPL-CCNC: 55 U/L (ref 40–150)
ALT SERPL W P-5'-P-CCNC: 27 U/L (ref 0–70)
ANION GAP SERPL CALCULATED.3IONS-SCNC: 5 MMOL/L (ref 3–14)
AST SERPL W P-5'-P-CCNC: 39 U/L (ref 0–45)
BASOPHILS # BLD MANUAL: 0 10E3/UL (ref 0–0.2)
BASOPHILS NFR BLD MANUAL: 0 %
BILIRUB SERPL-MCNC: 0.6 MG/DL (ref 0.2–1.3)
BUN SERPL-MCNC: 16 MG/DL (ref 7–30)
CALCIUM SERPL-MCNC: 9.8 MG/DL (ref 8.5–10.1)
CHLORIDE BLD-SCNC: 102 MMOL/L (ref 94–109)
CO2 SERPL-SCNC: 28 MMOL/L (ref 20–32)
CREAT SERPL-MCNC: 1.02 MG/DL (ref 0.66–1.25)
D DIMER PPP FEU-MCNC: 0.32 UG/ML FEU (ref 0–0.5)
EOSINOPHIL # BLD MANUAL: 0 10E3/UL (ref 0–0.7)
EOSINOPHIL NFR BLD MANUAL: 1 %
ERYTHROCYTE [DISTWIDTH] IN BLOOD BY AUTOMATED COUNT: 12 % (ref 10–15)
GFR SERPL CREATININE-BSD FRML MDRD: 78 ML/MIN/1.73M2
GLUCOSE BLD-MCNC: 162 MG/DL (ref 70–99)
GLUCOSE BLDC GLUCOMTR-MCNC: 173 MG/DL (ref 70–99)
HCT VFR BLD AUTO: 39.9 % (ref 40–53)
HGB BLD-MCNC: 12.9 G/DL (ref 13.3–17.7)
LACTATE SERPL-SCNC: 1 MMOL/L (ref 0.7–2)
LYMPHOCYTES # BLD MANUAL: 2.1 10E3/UL (ref 0.8–5.3)
LYMPHOCYTES NFR BLD MANUAL: 43 %
MCH RBC QN AUTO: 27.9 PG (ref 26.5–33)
MCHC RBC AUTO-ENTMCNC: 32.3 G/DL (ref 31.5–36.5)
MCV RBC AUTO: 86 FL (ref 78–100)
MONOCYTES # BLD MANUAL: 1.1 10E3/UL (ref 0–1.3)
MONOCYTES NFR BLD MANUAL: 23 %
NEUTROPHILS # BLD MANUAL: 1.6 10E3/UL (ref 1.6–8.3)
NEUTROPHILS NFR BLD MANUAL: 33 %
PLAT MORPH BLD: NORMAL
PLATELET # BLD AUTO: 114 10E3/UL (ref 150–450)
POTASSIUM BLD-SCNC: 5 MMOL/L (ref 3.4–5.3)
PROT SERPL-MCNC: 7.6 G/DL (ref 6.8–8.8)
RBC # BLD AUTO: 4.62 10E6/UL (ref 4.4–5.9)
RBC MORPH BLD: NORMAL
SODIUM SERPL-SCNC: 135 MMOL/L (ref 133–144)
TROPONIN I SERPL HS-MCNC: 9 NG/L
WBC # BLD AUTO: 4.8 10E3/UL (ref 4–11)

## 2022-11-17 PROCEDURE — 83605 ASSAY OF LACTIC ACID: CPT | Performed by: EMERGENCY MEDICINE

## 2022-11-17 PROCEDURE — 84484 ASSAY OF TROPONIN QUANT: CPT | Performed by: EMERGENCY MEDICINE

## 2022-11-17 PROCEDURE — 70450 CT HEAD/BRAIN W/O DYE: CPT

## 2022-11-17 PROCEDURE — 80053 COMPREHEN METABOLIC PANEL: CPT | Performed by: EMERGENCY MEDICINE

## 2022-11-17 PROCEDURE — 85379 FIBRIN DEGRADATION QUANT: CPT | Performed by: EMERGENCY MEDICINE

## 2022-11-17 PROCEDURE — 36415 COLL VENOUS BLD VENIPUNCTURE: CPT | Performed by: EMERGENCY MEDICINE

## 2022-11-17 PROCEDURE — 99285 EMERGENCY DEPT VISIT HI MDM: CPT | Mod: 25 | Performed by: EMERGENCY MEDICINE

## 2022-11-17 PROCEDURE — 93010 ELECTROCARDIOGRAM REPORT: CPT | Performed by: EMERGENCY MEDICINE

## 2022-11-17 PROCEDURE — 71046 X-RAY EXAM CHEST 2 VIEWS: CPT

## 2022-11-17 PROCEDURE — 85007 BL SMEAR W/DIFF WBC COUNT: CPT | Performed by: EMERGENCY MEDICINE

## 2022-11-17 PROCEDURE — 85027 COMPLETE CBC AUTOMATED: CPT | Performed by: EMERGENCY MEDICINE

## 2022-11-17 PROCEDURE — 93005 ELECTROCARDIOGRAM TRACING: CPT | Performed by: EMERGENCY MEDICINE

## 2022-11-17 PROCEDURE — 83036 HEMOGLOBIN GLYCOSYLATED A1C: CPT | Performed by: EMERGENCY MEDICINE

## 2022-11-17 ASSESSMENT — ACTIVITIES OF DAILY LIVING (ADL): ADLS_ACUITY_SCORE: 35

## 2022-11-18 ENCOUNTER — APPOINTMENT (OUTPATIENT)
Dept: CARDIOLOGY | Facility: CLINIC | Age: 72
End: 2022-11-18
Attending: INTERNAL MEDICINE
Payer: COMMERCIAL

## 2022-11-18 PROBLEM — U07.1 INFECTION DUE TO 2019 NOVEL CORONAVIRUS: Status: ACTIVE | Noted: 2022-11-18

## 2022-11-18 PROBLEM — R55 SYNCOPE: Status: ACTIVE | Noted: 2022-11-18

## 2022-11-18 LAB
ALBUMIN UR-MCNC: 20 MG/DL
APPEARANCE UR: CLEAR
ATRIAL RATE - MUSE: 72 BPM
BACTERIA #/AREA URNS HPF: ABNORMAL /HPF
BILIRUB UR QL STRIP: NEGATIVE
COLOR UR AUTO: YELLOW
DIASTOLIC BLOOD PRESSURE - MUSE: NORMAL MMHG
GLUCOSE BLDC GLUCOMTR-MCNC: 128 MG/DL (ref 70–99)
GLUCOSE BLDC GLUCOMTR-MCNC: 191 MG/DL (ref 70–99)
GLUCOSE UR STRIP-MCNC: 70 MG/DL
HBA1C MFR BLD: 8 % (ref 0–5.6)
HGB UR QL STRIP: NEGATIVE
HOLD SPECIMEN: NORMAL
HOLD SPECIMEN: NORMAL
HYALINE CASTS: 1 /LPF
INTERPRETATION ECG - MUSE: NORMAL
KETONES UR STRIP-MCNC: ABNORMAL MG/DL
LEUKOCYTE ESTERASE UR QL STRIP: NEGATIVE
LVEF ECHO: NORMAL
MUCOUS THREADS #/AREA URNS LPF: PRESENT /LPF
NITRATE UR QL: NEGATIVE
P AXIS - MUSE: 32 DEGREES
PH UR STRIP: 5.5 [PH] (ref 5–7)
POTASSIUM BLD-SCNC: 4 MMOL/L (ref 3.4–5.3)
PR INTERVAL - MUSE: 162 MS
QRS DURATION - MUSE: 82 MS
QT - MUSE: 368 MS
QTC - MUSE: 402 MS
R AXIS - MUSE: 34 DEGREES
RBC URINE: 2 /HPF
SARS-COV-2 RNA RESP QL NAA+PROBE: POSITIVE
SP GR UR STRIP: 1.02 (ref 1–1.03)
SYSTOLIC BLOOD PRESSURE - MUSE: NORMAL MMHG
T AXIS - MUSE: 85 DEGREES
TROPONIN I SERPL HS-MCNC: 10 NG/L
UROBILINOGEN UR STRIP-MCNC: NORMAL MG/DL
VENTRICULAR RATE- MUSE: 72 BPM
WBC URINE: 1 /HPF

## 2022-11-18 PROCEDURE — 93306 TTE W/DOPPLER COMPLETE: CPT | Mod: 26 | Performed by: INTERNAL MEDICINE

## 2022-11-18 PROCEDURE — 36415 COLL VENOUS BLD VENIPUNCTURE: CPT | Performed by: EMERGENCY MEDICINE

## 2022-11-18 PROCEDURE — 84132 ASSAY OF SERUM POTASSIUM: CPT | Performed by: EMERGENCY MEDICINE

## 2022-11-18 PROCEDURE — 250N000013 HC RX MED GY IP 250 OP 250 PS 637: Performed by: INTERNAL MEDICINE

## 2022-11-18 PROCEDURE — 96372 THER/PROPH/DIAG INJ SC/IM: CPT | Mod: 59 | Performed by: STUDENT IN AN ORGANIZED HEALTH CARE EDUCATION/TRAINING PROGRAM

## 2022-11-18 PROCEDURE — U0005 INFEC AGEN DETEC AMPLI PROBE: HCPCS | Performed by: EMERGENCY MEDICINE

## 2022-11-18 PROCEDURE — 81001 URINALYSIS AUTO W/SCOPE: CPT | Performed by: EMERGENCY MEDICINE

## 2022-11-18 PROCEDURE — G0378 HOSPITAL OBSERVATION PER HR: HCPCS

## 2022-11-18 PROCEDURE — 93306 TTE W/DOPPLER COMPLETE: CPT

## 2022-11-18 PROCEDURE — 82962 GLUCOSE BLOOD TEST: CPT

## 2022-11-18 PROCEDURE — 96361 HYDRATE IV INFUSION ADD-ON: CPT

## 2022-11-18 PROCEDURE — 99220 PR INITIAL OBSERVATION CARE,LEVEL III: CPT | Performed by: STUDENT IN AN ORGANIZED HEALTH CARE EDUCATION/TRAINING PROGRAM

## 2022-11-18 PROCEDURE — 250N000011 HC RX IP 250 OP 636: Performed by: STUDENT IN AN ORGANIZED HEALTH CARE EDUCATION/TRAINING PROGRAM

## 2022-11-18 PROCEDURE — 96374 THER/PROPH/DIAG INJ IV PUSH: CPT

## 2022-11-18 PROCEDURE — 258N000003 HC RX IP 258 OP 636: Performed by: EMERGENCY MEDICINE

## 2022-11-18 PROCEDURE — 258N000003 HC RX IP 258 OP 636

## 2022-11-18 PROCEDURE — 250N000011 HC RX IP 250 OP 636: Performed by: INTERNAL MEDICINE

## 2022-11-18 PROCEDURE — 250N000013 HC RX MED GY IP 250 OP 250 PS 637: Performed by: STUDENT IN AN ORGANIZED HEALTH CARE EDUCATION/TRAINING PROGRAM

## 2022-11-18 PROCEDURE — 84484 ASSAY OF TROPONIN QUANT: CPT | Performed by: EMERGENCY MEDICINE

## 2022-11-18 RX ORDER — ACETAMINOPHEN 325 MG/1
650 TABLET ORAL EVERY 4 HOURS PRN
Status: DISCONTINUED | OUTPATIENT
Start: 2022-11-18 | End: 2022-11-18

## 2022-11-18 RX ORDER — METFORMIN HCL 500 MG
500 TABLET, EXTENDED RELEASE 24 HR ORAL 2 TIMES DAILY WITH MEALS
Status: DISCONTINUED | OUTPATIENT
Start: 2022-11-18 | End: 2022-11-18

## 2022-11-18 RX ORDER — LIDOCAINE 40 MG/G
CREAM TOPICAL
Status: DISCONTINUED | OUTPATIENT
Start: 2022-11-18 | End: 2022-11-19 | Stop reason: HOSPADM

## 2022-11-18 RX ORDER — LOSARTAN POTASSIUM 50 MG/1
50 TABLET ORAL DAILY
COMMUNITY

## 2022-11-18 RX ORDER — TAMSULOSIN HYDROCHLORIDE 0.4 MG/1
0.8 CAPSULE ORAL DAILY
Status: DISCONTINUED | OUTPATIENT
Start: 2022-11-18 | End: 2022-11-19 | Stop reason: HOSPADM

## 2022-11-18 RX ORDER — ONDANSETRON 4 MG/1
4 TABLET, ORALLY DISINTEGRATING ORAL EVERY 6 HOURS PRN
Status: DISCONTINUED | OUTPATIENT
Start: 2022-11-18 | End: 2022-11-19 | Stop reason: HOSPADM

## 2022-11-18 RX ORDER — SODIUM CHLORIDE 9 MG/ML
INJECTION, SOLUTION INTRAVENOUS
Status: COMPLETED
Start: 2022-11-18 | End: 2022-11-18

## 2022-11-18 RX ORDER — SIMVASTATIN 40 MG
40 TABLET ORAL AT BEDTIME
Status: DISCONTINUED | OUTPATIENT
Start: 2022-11-18 | End: 2022-11-19 | Stop reason: HOSPADM

## 2022-11-18 RX ORDER — GLIPIZIDE 5 MG/1
5 TABLET, FILM COATED, EXTENDED RELEASE ORAL DAILY
COMMUNITY

## 2022-11-18 RX ORDER — ASPIRIN 81 MG/1
81 TABLET ORAL DAILY
Status: DISCONTINUED | OUTPATIENT
Start: 2022-11-18 | End: 2022-11-19 | Stop reason: HOSPADM

## 2022-11-18 RX ORDER — LOSARTAN POTASSIUM 50 MG/1
50 TABLET ORAL DAILY
Status: DISCONTINUED | OUTPATIENT
Start: 2022-11-18 | End: 2022-11-19 | Stop reason: HOSPADM

## 2022-11-18 RX ORDER — DEXTROSE MONOHYDRATE 25 G/50ML
25-50 INJECTION, SOLUTION INTRAVENOUS
Status: DISCONTINUED | OUTPATIENT
Start: 2022-11-18 | End: 2022-11-19 | Stop reason: HOSPADM

## 2022-11-18 RX ORDER — ACETAMINOPHEN 650 MG/1
650 SUPPOSITORY RECTAL EVERY 4 HOURS PRN
Status: DISCONTINUED | OUTPATIENT
Start: 2022-11-18 | End: 2022-11-18

## 2022-11-18 RX ORDER — VITAMIN B COMPLEX
2000 TABLET ORAL DAILY
Status: DISCONTINUED | OUTPATIENT
Start: 2022-11-18 | End: 2022-11-19 | Stop reason: HOSPADM

## 2022-11-18 RX ORDER — GABAPENTIN 300 MG/1
300 CAPSULE ORAL 2 TIMES DAILY
COMMUNITY

## 2022-11-18 RX ORDER — ACETAMINOPHEN 325 MG/1
650 TABLET ORAL EVERY 4 HOURS PRN
Status: DISCONTINUED | OUTPATIENT
Start: 2022-11-18 | End: 2022-11-19 | Stop reason: HOSPADM

## 2022-11-18 RX ORDER — ONDANSETRON 2 MG/ML
4 INJECTION INTRAMUSCULAR; INTRAVENOUS EVERY 6 HOURS PRN
Status: DISCONTINUED | OUTPATIENT
Start: 2022-11-18 | End: 2022-11-19 | Stop reason: HOSPADM

## 2022-11-18 RX ORDER — NITROGLYCERIN 0.4 MG/1
0.4 TABLET SUBLINGUAL EVERY 5 MIN PRN
Status: DISCONTINUED | OUTPATIENT
Start: 2022-11-18 | End: 2022-11-19 | Stop reason: HOSPADM

## 2022-11-18 RX ORDER — ENOXAPARIN SODIUM 100 MG/ML
40 INJECTION SUBCUTANEOUS EVERY 24 HOURS
Status: DISCONTINUED | OUTPATIENT
Start: 2022-11-18 | End: 2022-11-19 | Stop reason: HOSPADM

## 2022-11-18 RX ORDER — HYDROCHLOROTHIAZIDE 25 MG/1
25 TABLET ORAL DAILY
Status: DISCONTINUED | OUTPATIENT
Start: 2022-11-18 | End: 2022-11-19 | Stop reason: HOSPADM

## 2022-11-18 RX ORDER — HYDRALAZINE HYDROCHLORIDE 20 MG/ML
10 INJECTION INTRAMUSCULAR; INTRAVENOUS EVERY 4 HOURS PRN
Status: DISCONTINUED | OUTPATIENT
Start: 2022-11-18 | End: 2022-11-19 | Stop reason: HOSPADM

## 2022-11-18 RX ORDER — NICOTINE POLACRILEX 4 MG
15-30 LOZENGE BUCCAL
Status: DISCONTINUED | OUTPATIENT
Start: 2022-11-18 | End: 2022-11-19 | Stop reason: HOSPADM

## 2022-11-18 RX ORDER — SODIUM CHLORIDE 9 MG/ML
INJECTION, SOLUTION INTRAVENOUS CONTINUOUS
Status: DISCONTINUED | OUTPATIENT
Start: 2022-11-18 | End: 2022-11-19 | Stop reason: HOSPADM

## 2022-11-18 RX ADMIN — HYDRALAZINE HYDROCHLORIDE 10 MG: 20 INJECTION INTRAMUSCULAR; INTRAVENOUS at 18:05

## 2022-11-18 RX ADMIN — HYDROCHLOROTHIAZIDE 25 MG: 25 TABLET ORAL at 20:02

## 2022-11-18 RX ADMIN — ASPIRIN 81 MG: 81 TABLET, COATED ORAL at 18:01

## 2022-11-18 RX ADMIN — SODIUM CHLORIDE 1000 ML: 9 INJECTION, SOLUTION INTRAVENOUS at 01:08

## 2022-11-18 RX ADMIN — SODIUM CHLORIDE: 9 INJECTION, SOLUTION INTRAVENOUS at 03:59

## 2022-11-18 RX ADMIN — METFORMIN HYDROCHLORIDE 1000 MG: 500 TABLET, FILM COATED ORAL at 18:01

## 2022-11-18 RX ADMIN — LOSARTAN POTASSIUM 50 MG: 50 TABLET, FILM COATED ORAL at 18:02

## 2022-11-18 RX ADMIN — SODIUM CHLORIDE: 9 INJECTION, SOLUTION INTRAVENOUS at 22:44

## 2022-11-18 RX ADMIN — TAMSULOSIN HYDROCHLORIDE 0.8 MG: 0.4 CAPSULE ORAL at 18:03

## 2022-11-18 RX ADMIN — ENOXAPARIN SODIUM 40 MG: 40 INJECTION SUBCUTANEOUS at 18:10

## 2022-11-18 RX ADMIN — Medication 2000 UNITS: at 18:01

## 2022-11-18 RX ADMIN — SIMVASTATIN 40 MG: 40 TABLET, FILM COATED ORAL at 22:46

## 2022-11-18 ASSESSMENT — ENCOUNTER SYMPTOMS
ABDOMINAL PAIN: 0
RESPIRATORY NEGATIVE: 1
SPEECH DIFFICULTY: 0
EYES NEGATIVE: 1
RECTAL PAIN: 0
MYALGIAS: 0
SEIZURES: 0
NECK STIFFNESS: 0
VOMITING: 0
ACTIVITY CHANGE: 1
DIZZINESS: 0
NAUSEA: 0
BLOOD IN STOOL: 0
ABDOMINAL DISTENTION: 0
ANAL BLEEDING: 0
FEVER: 0
BRUISES/BLEEDS EASILY: 0
DIAPHORESIS: 0
BACK PAIN: 0
HEMATURIA: 0
SHORTNESS OF BREATH: 1
FLANK PAIN: 0
DIFFICULTY URINATING: 0
TROUBLE SWALLOWING: 0
FREQUENCY: 1
PALPITATIONS: 0
WEAKNESS: 0
TREMORS: 0
DIARRHEA: 0
FATIGUE: 1
CONFUSION: 0
COUGH: 0
NUMBNESS: 0
APPETITE CHANGE: 1
HEADACHES: 0
EYE PAIN: 0
FACIAL ASYMMETRY: 0
PSYCHIATRIC NEGATIVE: 1
CONSTIPATION: 0
DYSURIA: 0
CHILLS: 0
ALLERGIC/IMMUNOLOGIC NEGATIVE: 1
HEMATOLOGIC/LYMPHATIC NEGATIVE: 1
APPETITE CHANGE: 0
LIGHT-HEADEDNESS: 0
JOINT SWELLING: 0
ARTHRALGIAS: 0

## 2022-11-18 ASSESSMENT — ACTIVITIES OF DAILY LIVING (ADL)
ADLS_ACUITY_SCORE: 39
ADLS_ACUITY_SCORE: 35
ADLS_ACUITY_SCORE: 39
ADLS_ACUITY_SCORE: 39
ADLS_ACUITY_SCORE: 35
ADLS_ACUITY_SCORE: 39
ADLS_ACUITY_SCORE: 35

## 2022-11-18 NOTE — ED TRIAGE NOTES
Patient presents due to dizziness, weakness and incontinent of urine starting this morning. Patient has never had this happen before. Patient is diabetic and may have high blood sugar. Patient has not checked blood sugar today.     Triage Assessment     Row Name 11/17/22 1910       Triage Assessment (Adult)    Airway WDL WDL       Respiratory WDL    Respiratory WDL WDL       Skin Circulation/Temperature WDL    Skin Circulation/Temperature WDL WDL       Cardiac WDL    Cardiac WDL WDL       Peripheral/Neurovascular WDL    Peripheral Neurovascular WDL WDL       Cognitive/Neuro/Behavioral WDL    Cognitive/Neuro/Behavioral WDL WDL

## 2022-11-18 NOTE — PHARMACY-ADMISSION MEDICATION HISTORY
Admission Medication History Completed by Pharmacy    See [facility] Admission Navigator for allergy information, preferred outpatient pharmacy, prior to admission medications and immunization status.     Medication History Sources:     ONLY Dispense History    Changes made to PTA medication list (reason):    Added: Gabapentin, Metformin. Glipizide ER    Deleted: Hydrochlorothiazide, metformin ER     Changed: Losartan 100 mg->50 mg, Tamsulosin 0.4 mg->0.8 mg,  Added SIG ibuprofen, acetaminophen, vitamin d    Additional Information:    None    Prior to Admission medications    Medication Sig Last Dose Taking? Auth Provider Long Term End Date   cholecalciferol 50 MCG (2000 UT) tablet Take 2,000 Units by mouth daily Unknown Yes Reported, Patient     gabapentin (NEURONTIN) 300 MG capsule Take 300 mg by mouth 2 times daily  Yes Unknown, Entered By History No    glipiZIDE (GLUCOTROL XL) 5 MG 24 hr tablet Take 5 mg by mouth daily  Yes Unknown, Entered By History No    losartan (COZAAR) 50 MG tablet Take 50 mg by mouth daily Unknown Yes Unknown, Entered By History Yes    metFORMIN (GLUCOPHAGE) 1000 MG tablet Take 1,000 mg by mouth 2 times daily (with meals) Unknown Yes Unknown, Entered By History No    simvastatin (ZOCOR) 40 MG tablet Take 1 tablet (40 mg) by mouth At Bedtime Unknown Yes Bay Miller MD Yes    acetaminophen (TYLENOL) 325 MG tablet Take 650 mg by mouth every 6 hours as needed   Reported, Patient     Alcohol Swabs (ALCOHOL PREP) PADS 1 each 2 times daily   Bay Miller MD     aspirin 81 MG tablet Take 1 tablet (81 mg) by mouth daily   Marya Wolf MD     B Complex Vitamins (VITAMIN-B COMPLEX) TABS    Reported, Patient     blood glucose monitoring (MANUEL CONTOUR NEXT) test strip Use to test blood sugar 2 times daily or as directed.  Ok to substitute alternative if insurance prefers.   Lupe Hendrickson MD     ibuprofen (ADVIL/MOTRIN) 600 MG tablet Take 600 mg by mouth every 6 hours as  needed   Reported, Patient     Multiple Vitamin (DAILY VITES) TABS TAKE 1 TABLET BY MOUTH EVERY DAY   Reported, Patient     tamsulosin (FLOMAX) 0.4 MG capsule Take 0.8 mg by mouth daily   Reported, Patient         Date completed: 11/18/22    Medication history completed by: Wilfredo Barroso [facility]

## 2022-11-18 NOTE — PROGRESS NOTES
Patient seen and examined    Chart reviewed    Echo result pending    Blood pressure is currently elevated    A/p: Will reintroduce patient's PTA losartan.   I will also reintroduce patient's metformin.  Monitor patient over night.    Anticipate discharge to home in a.m.        Conor Kemp MD.   Hospitalist.  [phone], pager.

## 2022-11-18 NOTE — H&P
"[facility]    History and Physical - Hospitalist Service, GOLD TEAM        Date of Admission:  11/17/2022    Assessment & Plan      Noemí Lassiter is a 72 year old male admitted on 11/17/2022 for further work-up of syncope in the setting of COVID infection.     Syncope  Despite use of iPad , the patient was unable to provide significant history about the circumstances of his episode of syncope. His daughter, present at bedside, was not there when it happened but he called her afterwards. He did not injure himself. CT head negative. This occurred about a week into generalized illness of feeling fatigued with decreased appetite that he attributed to \"flu.\" ED initially requested emergency department observation for cardiac work-up, but then due to COVID positive test general medical admission was requested instead.   - Likely hypovolemic in the setting of antihypertensive medications and decreased oral intake, but will perform basic cardiac evaluation. Troponin and ECG in ED normal.   - Echocardiogram, telemetry for 24 hours ordered  - Orthostatic vital signs  - Hold home gabapentin for now  - Hold home antihypertensive medications for now    COVID-19  Per patient's daughter, symptoms ongoing for about 7 days prior to presentation. On room air with minimal symptoms at this time. 2 immunizations for COVID on file. CXR without any significant infiltrate.   - Due to duration since onset and paucity of current symptoms, hold COVID-directed therapy at this time  - COVID labs ordered    Generalized weakness  Likely related to viral illness. Head CT normal, exam non-focal. He does have a history of cervical radiculitis so if weakness proves out of proportion to medical illness, consider neck imaging.   - Physical therapy consulted    Urinary urgency and incontinence  History of BPH per chart review. UA not consistent with UTI. Likely due to illness, but if not " improving or other issues consider neurogenic cause.   - Continue home tamsulosin. Appreciate pharmacy med rec to confirm/refute use of finasteride  - Bladder scan to evaluate for retention    Hypertension  Due to syncope, hold home antihypertensive medications for now pending orthostatic vital signs    Type 2 diabetes mellitus, not on insulin at home  Hgb A1C 8.0  - Pharmacy med rec ordered. Hold home oral medications for now.   - Sliding scale insulin    Presumed peripheral neuropathy  Gabapentin prescribed per [facility]. Given associated fall risk, will hold for now. Appreciate pharmacy verifying medications.     Chronic hearing loss  No recent changes, able to understand spoken conversation with iPad     # Confirmed COVID-19 infection         Symptom Onset 11/11/2022 per family report   Date of 1st Positive Test Use date of first positive test. 11/18/2022   Vaccination Status Fully Vaccinated but without boosters per records         - Oxygen:Currently stable on room air. Notify MD if oxygen need develops.   - Labs: Standard COVID admission labs ordered (CBC with diff, CMP, INR, D-dimer, CRP).   - Imaging: no additional imaging needed at this time  - Breathing treatments: no inhalers needed; avoid nebulizers in favor of MDIs   - IV fluids: not indicated at this time  - Antibiotics: not indicated   - COVID-Focused Medications: No COVID-specific therapies are appropriate at this time.  - DVT Prophylaxis:        - At high risk of thrombotic complications due to COVID-19 (DDimer = 0.32 ug/mL FEU (Ref range: 0.00 - 0.50 ug/mL FEU) )         - PROPHYLACTIC dosing: lovenox 40mg daily     Diet:   General adult diet  DVT Prophylaxis: Enoxaparin (Lovenox) SQ  Hernandez Catheter: Not present  Central Lines: None  Cardiac Monitoring: None  Code Status:   Full code per discussion with patient using  iPad    Clinically Significant Risk Factors Present on Admission         # Hyponatremia: Lowest Na = 135 mmol/L  "(Ref range: 136-145) in last 2 days, will monitor as appropriate        # Thrombocytopenia: Lowest platelets = 114 (Ref range: 150-450) in last 2 days, will monitor for bleeding   # Hypertension: home medication list includes antihypertensive(s)    # DMII: A1C = 8.0 % (Ref range: 0.0 - 5.6 %) within past 3 months    # Overweight: Estimated body mass index is 27.28 kg/m  as calculated from the following:    Height as of this encounter: 1.676 m (5' 6\").    Weight as of this encounter: 76.7 kg (169 lb).           Disposition Plan      Expected Discharge Date: 11/19/2022                The patient's care was discussed with the Bedside Nurse, Patient and Patient's Family.    Tanmay Beatty MD  Hospitalist Service, [facility]  Securely message with the Vocera Web Console (learn more here)  Text page via [employer] Paging/Directory   Please see signed in provider for up to date coverage information      ______________________________________________________________________    Chief Complaint   Syncope    History is obtained from the patient using iPad  with some collateral history from the patient's daughter as she was better able to specify time frames and specific symptoms.     History of Present Illness   Noemí Lassiter is a 72 year old male who has a history of type 2 diabetes mellitus not requiring insulin, hypertension, hyperlipidemia, BPH, and chronic hearing loss here due to an episode of syncope. He had been feeling ill for about a week with symptoms that he described as \"flu\" and his daughter elaborated as generally fatigued, decreased oral intake, and intermittent fevers. He had not been eating well during that time but he had been drinking plenty of water. Yesterday when the syncope occurred he did not check his blood glucose due to feeling confused. He said that he did not remember the circumstances around his episode of syncope but did not " injure himself. His daughter was not present when the syncope occurred but he called her afterwards. He has not had previous episodes of syncope. He was not able to endorse or refute prodromal symptoms. Urinary urgency and incontinence also started the day of syncope and presentation per his report, although the chart does have mention of urge urinary incontinence previously. No cough. Mild shortness of breath with exertion with current illness. Decreased appetite but no vomiting or diarrhea. No blood loss.     Review of Systems    Review of Systems   Constitutional: Positive for activity change, appetite change and fatigue.   HENT: Negative for dental problem, ear pain and trouble swallowing.    Eyes: Negative for pain and visual disturbance.   Respiratory: Positive for shortness of breath. Negative for cough.    Cardiovascular: Negative for chest pain and palpitations.   Gastrointestinal: Negative for abdominal pain, diarrhea and vomiting.   Endocrine: Negative for polyuria.   Genitourinary: Positive for frequency and urgency.   Musculoskeletal: Positive for gait problem.   Skin: Negative for rash.   Allergic/Immunologic: Negative for immunocompromised state.   Neurological: Positive for syncope. Negative for speech difficulty.   Hematological: Does not bruise/bleed easily.   Psychiatric/Behavioral: Negative for confusion.       Past Medical History    I have reviewed this patient's medical history and updated it with pertinent information if needed.   Past Medical History:   Diagnosis Date     BPH (benign prostatic hyperplasia)      DM (diabetes mellitus), type 2 (H)      HLD (hyperlipidemia)      HTN (hypertension)        Past Surgical History   I have reviewed this patient's surgical history and updated it with pertinent information if needed.  Past Surgical History:   Procedure Laterality Date     CATARACT IOL, RT/LT Left      ZZC PROSTHETIC EYE OTHER TYPE Right     In [place]        Social History   I have  reviewed this patient's social history and updated it with pertinent information if needed.  Social History     Tobacco Use     Smoking status: Never     Smokeless tobacco: Never   Substance Use Topics     Alcohol use: No     Drug use: No       Family History   I have reviewed this patient's family history and updated it with pertinent information if needed.  Family History   Problem Relation Age of Onset     Eye Surgery Paternal Grandfather        Prior to Admission Medications   Prior to Admission Medications   Prescriptions Last Dose Informant Patient Reported? Taking?   ASPIR-LOW 81 MG EC tablet   Yes No   Alcohol Swabs (ALCOHOL PREP) PADS   No No   Si each 2 times daily   B Complex Vitamins (VITAMIN-B COMPLEX) TABS   Yes No   Multiple Vitamin (DAILY VITES) TABS   Yes No   Sig: TAKE 1 TABLET BY MOUTH EVERY DAY   acetaminophen (TYLENOL) 325 MG tablet   Yes No   Sig: Take 650 mg by mouth   aspirin 81 MG tablet   No No   Sig: Take 1 tablet (81 mg) by mouth daily   blood glucose monitoring (MANUEL CONTOUR NEXT) test strip   No No   Sig: Use to test blood sugar 2 times daily or as directed.  Ok to substitute alternative if insurance prefers.   cholecalciferol (D 2000) 2000 UNITS tablet   Yes No   Sig: Take 2,000 Units by mouth   hydrochlorothiazide 12.5 MG TABS tablet   No No   Sig: Take 2 tablets (25 mg) by mouth daily   ibuprofen (ADVIL/MOTRIN) 600 MG tablet   Yes No   Sig: Take 600 mg by mouth   losartan (COZAAR) 100 MG tablet   Yes No   Sig: Take 1 tablet (100 mg) by mouth daily   metFORMIN (GLUCOPHAGE-XR) 500 MG 24 hr tablet   No No   Sig: Take 1 tablet (500 mg) by mouth 2 times daily (with meals)   simvastatin (ZOCOR) 40 MG tablet   Yes No   Sig: Take 1 tablet (40 mg) by mouth At Bedtime   tamsulosin (FLOMAX) 0.4 MG capsule   Yes No   Sig: Take 0.4 mg by mouth      Facility-Administered Medications: None     Allergies   No Known Allergies    Physical Exam   Vital Signs: Temp: 98.4  F (36.9  C) Temp src: Oral  BP: (!) 159/81 Pulse: 81   Resp: 17 SpO2: 98 % O2 Device: None (Room air)    Weight: 169 lbs 0 oz  Physical Exam  Vitals reviewed.   Constitutional:       General: He is not in acute distress.     Appearance: He is not toxic-appearing.   HENT:      Head: Atraumatic.      Right Ear: External ear normal.      Left Ear: External ear normal.      Nose: Nose normal.      Mouth/Throat:      Mouth: Mucous membranes are moist.      Pharynx: No posterior oropharyngeal erythema.   Eyes:      General: No scleral icterus.        Right eye: No discharge.         Left eye: No discharge.      Conjunctiva/sclera: Conjunctivae normal.   Cardiovascular:      Rate and Rhythm: Normal rate and regular rhythm.      Pulses: Normal pulses.      Heart sounds: No murmur heard.  Pulmonary:      Effort: Pulmonary effort is normal. No respiratory distress.      Breath sounds: Wheezing present. No rhonchi or rales.   Abdominal:      General: Bowel sounds are normal. There is no distension.      Palpations: Abdomen is soft.      Tenderness: There is no abdominal tenderness. There is no guarding.   Musculoskeletal:         General: No tenderness.      Cervical back: Neck supple.      Right lower leg: No edema.      Left lower leg: No edema.   Skin:     General: Skin is warm.      Capillary Refill: Capillary refill takes less than 2 seconds.      Findings: No rash.   Neurological:      General: No focal deficit present.      Mental Status: He is alert.      Comments: Able to move all extremities equally in bed. Speech in Slovenian sounds fluent as he talks with the . Symmetric face. Easily engages in conversation.    Psychiatric:         Behavior: Behavior normal.         Data   Data reviewed today: I reviewed all medications, new labs and imaging results over the last 24 hours. I personally reviewed the EKG tracing showing sinus rhythm with non-specific t-wave inversions.    Recent Labs   Lab 11/18/22  0011 11/17/22  4805 11/17/22  [id number]  11/17/22 1922   WBC  --   --  4.8  --    HGB  --   --  12.9*  --    MCV  --   --  86  --    PLT  --   --  114*  --    NA  --  135  --   --    POTASSIUM 4.0 5.0  --   --    CHLORIDE  --  102  --   --    CO2  --  28  --   --    BUN  --  16  --   --    CR  --  1.02  --   --    ANIONGAP  --  5  --   --    NEFTALY  --  9.8  --   --    GLC  --  162*  --  173*   ALBUMIN  --  3.5  --   --    PROTTOTAL  --  7.6  --   --    BILITOTAL  --  0.6  --   --    ALKPHOS  --  55  --   --    ALT  --  27  --   --    AST  --  39  --   --      Recent Results (from the past 24 hour(s))   XR Chest 2 Views    Narrative    EXAM: XR CHEST 2 VIEWS  LOCATION: [facility]  DATE/TIME: 11/17/2022 11:50 PM    INDICATION: Chest pain  COMPARISON: 08/07/2020      Impression    IMPRESSION: Minimal linear scarring or atelectasis left lung base. No pulmonary infiltrates. Minimal pleural fluid in the posterior costophrenic angle. Normal heart size and pulmonary vascularity. Degenerative hypertrophic changes in the spine and right   shoulder.   CT Head w/o Contrast    Narrative    EXAM: CT HEAD W/O CONTRAST  LOCATION: [facility]  DATE/TIME: 11/17/2022 11:52 PM    INDICATION: Sudden onset headache.  COMPARISON: None.  TECHNIQUE: Routine CT Head without IV contrast. Multiplanar reformats. Dose reduction techniques were used.    FINDINGS:  INTRACRANIAL CONTENTS: No intracranial hemorrhage, extraaxial collection, or mass effect.  No acute large territory infarction. Small region of encephalomalacia identified involving the high left anterior frontal lobe, likely reflecting a sequela of   remote injury, likely ischemic. Mild presumed chronic small vessel ischemic changes. Mild generalized volume loss. No hydrocephalus.     VISUALIZED ORBITS/SINUSES/MASTOIDS: Status post right globe prosthesis. No paranasal sinus mucosal disease. No middle ear or mastoid  effusion.    BONES/SOFT TISSUES: No acute abnormality.      Impression    IMPRESSION:  1.  No CT evidence for acute intracranial process.  2.  Chronic changes, as above.

## 2022-11-18 NOTE — ED PROVIDER NOTES
[facility] [place] ([facility])    11/17/22          History     Chief Complaint   Patient presents with     Fatigue     Patient presents due to dizziness, weakness and incontinent of urine starting this morning. Patient has never had this happen before. Patient is diabetic and may have high blood sugar. Patient has not checked blood sugar today.     VANIA Lassiter is a 72 year old male with PMH of BPH, diabetes mellitus type II, hyperlipidemia, hypertension, and bilateral hearing loss who presents to the Emergency Department for evaluation of a syncopal episode earlier today and feeling weak. Patient reports sxs began with urinary frequency, to the point were patient had trouble with incontinence.  No chest pain or headache, but feels weak when he tried to walk.  No fever chills or respiratory sxs.  Unsure of onset of sxs timing but was ok this am and sxs developed over the course of the afternoon.      Past Medical History  Past Medical History:   Diagnosis Date     BPH (benign prostatic hyperplasia)      DM (diabetes mellitus), type 2 (H)      HLD (hyperlipidemia)      HTN (hypertension)      Past Surgical History:   Procedure Laterality Date     CATARACT IOL, RT/LT Left      ZZC PROSTHETIC EYE OTHER TYPE Right     In [place]      acetaminophen (TYLENOL) 325 MG tablet  Alcohol Swabs (ALCOHOL PREP) PADS  ASPIR-LOW 81 MG EC tablet  aspirin 81 MG tablet  B Complex Vitamins (VITAMIN-B COMPLEX) TABS  blood glucose monitoring (MANUEL CONTOUR NEXT) test strip  cholecalciferol (D 2000) 2000 UNITS tablet  hydrochlorothiazide 12.5 MG TABS tablet  ibuprofen (ADVIL/MOTRIN) 600 MG tablet  losartan (COZAAR) 100 MG tablet  metFORMIN (GLUCOPHAGE-XR) 500 MG 24 hr tablet  Multiple Vitamin (DAILY VITES) TABS  simvastatin (ZOCOR) 40 MG tablet  tamsulosin (FLOMAX) 0.4 MG capsule      No Known Allergies  Family History  Family History   Problem Relation Age of Onset     Eye Surgery Paternal Grandfather   "    Social History   Social History     Tobacco Use     Smoking status: Never     Smokeless tobacco: Never   Substance Use Topics     Alcohol use: No     Drug use: No      Past medical history, past surgical history, medications, allergies, family history, and social history were reviewed with the patient. No additional pertinent items.       Review of Systems   Constitutional: Positive for activity change and fatigue. Negative for appetite change, chills, diaphoresis and fever.   HENT: Negative.    Eyes: Negative.    Respiratory: Negative.    Cardiovascular: Negative for chest pain, palpitations and leg swelling.   Gastrointestinal: Negative for abdominal distention, abdominal pain, anal bleeding, blood in stool, constipation, diarrhea, nausea, rectal pain and vomiting.   Endocrine: Positive for polyuria.   Genitourinary: Positive for frequency, genital sores and urgency. Negative for decreased urine volume, difficulty urinating, dysuria, enuresis, flank pain, hematuria, penile discharge, penile pain, penile swelling and testicular pain.   Musculoskeletal: Positive for gait problem. Negative for arthralgias, back pain, joint swelling, myalgias and neck stiffness.   Skin: Negative.    Allergic/Immunologic: Negative.    Neurological: Negative for dizziness, tremors, seizures, syncope, facial asymmetry, speech difficulty, weakness, light-headedness, numbness and headaches.   Hematological: Negative.    Psychiatric/Behavioral: Negative.      A complete review of systems was performed with pertinent positives and negatives noted in the HPI, and all other systems negative.    Physical Exam   BP: (!) 160/83  Pulse: 87  Temp: 99  F (37.2  C)  Resp: 16  Height: 167.6 cm (5' 6\")  Weight: 76.7 kg (169 lb)  SpO2: 98 %  Physical Exam  Vitals and nursing note reviewed.   Constitutional:       General: He is not in acute distress.     Appearance: He is not ill-appearing, toxic-appearing or diaphoretic.   HENT:      Head: " Normocephalic and atraumatic.      Nose: Nose normal.      Mouth/Throat:      Mouth: Mucous membranes are moist.      Pharynx: Oropharynx is clear.   Eyes:      Extraocular Movements: Extraocular movements intact.      Conjunctiva/sclera: Conjunctivae normal.      Pupils: Pupils are equal, round, and reactive to light.   Cardiovascular:      Rate and Rhythm: Normal rate and regular rhythm.      Pulses: Normal pulses.      Heart sounds: Normal heart sounds.   Pulmonary:      Effort: Pulmonary effort is normal.      Breath sounds: Normal breath sounds.   Abdominal:      General: Abdomen is flat.      Palpations: Abdomen is soft.   Musculoskeletal:         General: Normal range of motion.      Cervical back: Normal range of motion and neck supple.   Skin:     General: Skin is warm and dry.   Neurological:      General: No focal deficit present.      Mental Status: He is alert.      Cranial Nerves: No cranial nerve deficit.      Sensory: No sensory deficit.      Motor: No weakness.      Coordination: Coordination normal.      Gait: Gait abnormal.      Deep Tendon Reflexes: Reflexes normal.      Comments: Gait genreally unsteady, nonfocal dysfunction.  No nystagumus, normal cerebellar fxn outside of shuffling gait   Psychiatric:         Mood and Affect: Mood normal.         Behavior: Behavior normal.         Thought Content: Thought content normal.         ED Course      Procedures            EKG Interpretation:      Interpreted by Dionisio Bryant MD  Time reviewed: 1200  Symptoms at time of EKG: none   Rhythm: normal sinus   Rate: normal  Axis: normal  Ectopy: none  Conduction: normal  ST Segments/ T Waves: No ST-T wave changes  Q Waves: none  Comparison to prior: No old EKG available    Clinical Impression: normal EKG                    Results for orders placed or performed during the hospital encounter of 11/17/22   XR Chest 2 Views     Status: None    Narrative    EXAM: XR CHEST 2 VIEWS  LOCATION: [facility]  DATE/TIME: 11/17/2022 11:50 PM    INDICATION: Chest pain  COMPARISON: 08/07/2020      Impression    IMPRESSION: Minimal linear scarring or atelectasis left lung base. No pulmonary infiltrates. Minimal pleural fluid in the posterior costophrenic angle. Normal heart size and pulmonary vascularity. Degenerative hypertrophic changes in the spine and right   shoulder.   CT Head w/o Contrast     Status: None    Narrative    EXAM: CT HEAD W/O CONTRAST  LOCATION: [facility]  DATE/TIME: 11/17/2022 11:52 PM    INDICATION: Sudden onset headache.  COMPARISON: None.  TECHNIQUE: Routine CT Head without IV contrast. Multiplanar reformats. Dose reduction techniques were used.    FINDINGS:  INTRACRANIAL CONTENTS: No intracranial hemorrhage, extraaxial collection, or mass effect.  No acute large territory infarction. Small region of encephalomalacia identified involving the high left anterior frontal lobe, likely reflecting a sequela of   remote injury, likely ischemic. Mild presumed chronic small vessel ischemic changes. Mild generalized volume loss. No hydrocephalus.     VISUALIZED ORBITS/SINUSES/MASTOIDS: Status post right globe prosthesis. No paranasal sinus mucosal disease. No middle ear or mastoid effusion.    BONES/SOFT TISSUES: No acute abnormality.      Impression    IMPRESSION:  1.  No CT evidence for acute intracranial process.  2.  Chronic changes, as above.   Glucose by meter     Status: Abnormal   Result Value Ref Range    GLUCOSE BY METER POCT 173 (H) 70 - 99 mg/dL   Comprehensive metabolic panel     Status: Abnormal   Result Value Ref Range    Sodium 135 133 - 144 mmol/L    Potassium 5.0 3.4 - 5.3 mmol/L    Chloride 102 94 - 109 mmol/L    Carbon Dioxide (CO2) 28 20 - 32 mmol/L    Anion Gap 5 3 - 14 mmol/L    Urea Nitrogen 16 7 - 30 mg/dL    Creatinine 1.02 0.66 - 1.25 mg/dL    Calcium 9.8 8.5 - 10.1 mg/dL    Glucose 162 (H) 70 - 99  mg/dL    Alkaline Phosphatase 55 40 - 150 U/L    AST 39 0 - 45 U/L    ALT 27 0 - 70 U/L    Protein Total 7.6 6.8 - 8.8 g/dL    Albumin 3.5 3.4 - 5.0 g/dL    Bilirubin Total 0.6 0.2 - 1.3 mg/dL    GFR Estimate 78 >60 mL/min/1.73m2   Lactic acid whole blood     Status: Normal   Result Value Ref Range    Lactic Acid 1.0 0.7 - 2.0 mmol/L   Troponin I     Status: Normal   Result Value Ref Range    Troponin I High Sensitivity 9 <79 ng/L   D dimer quantitative     Status: Normal   Result Value Ref Range    D-Dimer Quantitative 0.32 0.00 - 0.50 ug/mL FEU    Narrative    This D-dimer assay is intended for use in conjunction with a clinical pretest probability assessment model to exclude pulmonary embolism (PE) and deep venous thrombosis (DVT) in outpatients suspected of PE or DVT. The cut-off value is 0.50 ug/mL FEU.   CBC with platelets and differential     Status: Abnormal   Result Value Ref Range    WBC Count 4.8 4.0 - 11.0 10e3/uL    RBC Count 4.62 4.40 - 5.90 10e6/uL    Hemoglobin 12.9 (L) 13.3 - 17.7 g/dL    Hematocrit 39.9 (L) 40.0 - 53.0 %    MCV 86 78 - 100 fL    MCH 27.9 26.5 - 33.0 pg    MCHC 32.3 31.5 - 36.5 g/dL    RDW 12.0 10.0 - 15.0 %    Platelet Count 114 (L) 150 - 450 10e3/uL   Extra Tube ([place] Draw)     Status: None    Narrative    The following orders were created for panel order Extra Tube ([place] Draw).  Procedure                               Abnormality         Status                     ---------                               -----------         ------                     Extra Red Top Tube[[id number]]                               Final result               Extra Purple Top Tube[[id number]]                            Final result                 Please view results for these tests on the individual orders.   Extra Red Top Tube     Status: None   Result Value Ref Range    Hold Specimen JIC    Extra Purple Top Tube     Status: None   Result Value Ref Range    Hold Specimen JIC    Manual Differential      Status: None   Result Value Ref Range    % Neutrophils 33 %    % Lymphocytes 43 %    % Monocytes 23 %    % Eosinophils 1 %    % Basophils 0 %    Absolute Neutrophils 1.6 1.6 - 8.3 10e3/uL    Absolute Lymphocytes 2.1 0.8 - 5.3 10e3/uL    Absolute Monocytes 1.1 0.0 - 1.3 10e3/uL    Absolute Eosinophils 0.0 0.0 - 0.7 10e3/uL    Absolute Basophils 0.0 0.0 - 0.2 10e3/uL    RBC Morphology Confirmed RBC Indices     Platelet Assessment  Automated Count Confirmed. Platelet morphology is normal.     Automated Count Confirmed. Platelet morphology is normal.   Potassium     Status: Normal   Result Value Ref Range    Potassium 4.0 3.4 - 5.3 mmol/L   EKG 12 lead     Status: None (Preliminary result)   Result Value Ref Range    Systolic Blood Pressure  mmHg    Diastolic Blood Pressure  mmHg    Ventricular Rate 72 BPM    Atrial Rate 72 BPM    [place] Interval 162 ms    QRS Duration 82 ms     ms    QTc 402 ms    P Axis 32 degrees    R AXIS 34 degrees    T Axis 85 degrees    Interpretation ECG       Sinus rhythm  Nonspecific T wave abnormality  Abnormal ECG     CBC with platelets differential     Status: Abnormal    Narrative    The following orders were created for panel order CBC with platelets differential.  Procedure                               Abnormality         Status                     ---------                               -----------         ------                     CBC with platelets and d...[[id number]]  Abnormal            Final result               Manual Differential[[id number]]                              Final result                 Please view results for these tests on the individual orders.     Medications   0.9% sodium chloride BOLUS (1,000 mLs Intravenous New Bag 11/18/22 0108)     Followed by   sodium chloride 0.9% infusion (has no administration in time range)        Assessments & Plan (with Medical Decision Making)   Unclear etiology of sxs, history concerning for syncope from patients vague  description, appears comfortable in ED but unsteady when up to ambulate. Labs and EKG ok    I have reviewed the nursing notes. I have reviewed the findings, diagnosis, plan and need for follow up with the patient.    New Prescriptions    No medications on file       Final diagnoses:   Syncope, unspecified syncope type       --  Dionisio Bryant MD  [facility] EMERGENCY DEPARTMENT  11/17/2022     Dionisio Bryant MD  11/18/22 0217

## 2022-11-19 ENCOUNTER — APPOINTMENT (OUTPATIENT)
Dept: PHYSICAL THERAPY | Facility: CLINIC | Age: 72
End: 2022-11-19
Attending: STUDENT IN AN ORGANIZED HEALTH CARE EDUCATION/TRAINING PROGRAM
Payer: COMMERCIAL

## 2022-11-19 VITALS
HEIGHT: 66 IN | SYSTOLIC BLOOD PRESSURE: 148 MMHG | HEART RATE: 69 BPM | WEIGHT: 169 LBS | TEMPERATURE: 98 F | OXYGEN SATURATION: 97 % | RESPIRATION RATE: 16 BRPM | DIASTOLIC BLOOD PRESSURE: 62 MMHG | BODY MASS INDEX: 27.16 KG/M2

## 2022-11-19 LAB
ANION GAP SERPL CALCULATED.3IONS-SCNC: 6 MMOL/L (ref 3–14)
BUN SERPL-MCNC: 18 MG/DL (ref 7–30)
CALCIUM SERPL-MCNC: 9.1 MG/DL (ref 8.5–10.1)
CHLORIDE BLD-SCNC: 106 MMOL/L (ref 94–109)
CO2 SERPL-SCNC: 25 MMOL/L (ref 20–32)
CREAT SERPL-MCNC: 0.93 MG/DL (ref 0.66–1.25)
CRP SERPL-MCNC: 18 MG/L (ref 0–8)
D DIMER PPP FEU-MCNC: <0.27 UG/ML FEU (ref 0–0.5)
ERYTHROCYTE [DISTWIDTH] IN BLOOD BY AUTOMATED COUNT: 12 % (ref 10–15)
GFR SERPL CREATININE-BSD FRML MDRD: 87 ML/MIN/1.73M2
GLUCOSE BLD-MCNC: 163 MG/DL (ref 70–99)
GLUCOSE BLDC GLUCOMTR-MCNC: 174 MG/DL (ref 70–99)
HCT VFR BLD AUTO: 38.7 % (ref 40–53)
HGB BLD-MCNC: 13 G/DL (ref 13.3–17.7)
INR PPP: 1.1 (ref 0.85–1.15)
MCH RBC QN AUTO: 28.3 PG (ref 26.5–33)
MCHC RBC AUTO-ENTMCNC: 33.6 G/DL (ref 31.5–36.5)
MCV RBC AUTO: 84 FL (ref 78–100)
PLATELET # BLD AUTO: 112 10E3/UL (ref 150–450)
POTASSIUM BLD-SCNC: 3.9 MMOL/L (ref 3.4–5.3)
RBC # BLD AUTO: 4.59 10E6/UL (ref 4.4–5.9)
SODIUM SERPL-SCNC: 137 MMOL/L (ref 133–144)
WBC # BLD AUTO: 3.3 10E3/UL (ref 4–11)

## 2022-11-19 PROCEDURE — 250N000013 HC RX MED GY IP 250 OP 250 PS 637: Performed by: INTERNAL MEDICINE

## 2022-11-19 PROCEDURE — 250N000013 HC RX MED GY IP 250 OP 250 PS 637: Performed by: STUDENT IN AN ORGANIZED HEALTH CARE EDUCATION/TRAINING PROGRAM

## 2022-11-19 PROCEDURE — G0378 HOSPITAL OBSERVATION PER HR: HCPCS

## 2022-11-19 PROCEDURE — 85379 FIBRIN DEGRADATION QUANT: CPT | Performed by: STUDENT IN AN ORGANIZED HEALTH CARE EDUCATION/TRAINING PROGRAM

## 2022-11-19 PROCEDURE — 86140 C-REACTIVE PROTEIN: CPT | Performed by: STUDENT IN AN ORGANIZED HEALTH CARE EDUCATION/TRAINING PROGRAM

## 2022-11-19 PROCEDURE — 85610 PROTHROMBIN TIME: CPT | Performed by: STUDENT IN AN ORGANIZED HEALTH CARE EDUCATION/TRAINING PROGRAM

## 2022-11-19 PROCEDURE — 82962 GLUCOSE BLOOD TEST: CPT

## 2022-11-19 PROCEDURE — 97162 PT EVAL MOD COMPLEX 30 MIN: CPT | Mod: GP

## 2022-11-19 PROCEDURE — 36415 COLL VENOUS BLD VENIPUNCTURE: CPT | Performed by: STUDENT IN AN ORGANIZED HEALTH CARE EDUCATION/TRAINING PROGRAM

## 2022-11-19 PROCEDURE — 99217 PR OBSERVATION CARE DISCHARGE: CPT | Performed by: INTERNAL MEDICINE

## 2022-11-19 PROCEDURE — 85027 COMPLETE CBC AUTOMATED: CPT | Performed by: STUDENT IN AN ORGANIZED HEALTH CARE EDUCATION/TRAINING PROGRAM

## 2022-11-19 PROCEDURE — 97530 THERAPEUTIC ACTIVITIES: CPT | Mod: GP

## 2022-11-19 PROCEDURE — 80048 BASIC METABOLIC PNL TOTAL CA: CPT | Performed by: STUDENT IN AN ORGANIZED HEALTH CARE EDUCATION/TRAINING PROGRAM

## 2022-11-19 RX ORDER — LOSARTAN POTASSIUM 50 MG/1
50 TABLET ORAL DAILY
Status: DISCONTINUED | OUTPATIENT
Start: 2022-11-19 | End: 2022-11-19

## 2022-11-19 RX ORDER — GLIPIZIDE 5 MG/1
5 TABLET, FILM COATED, EXTENDED RELEASE ORAL DAILY
Status: DISCONTINUED | OUTPATIENT
Start: 2022-11-19 | End: 2022-11-19 | Stop reason: HOSPADM

## 2022-11-19 RX ORDER — GABAPENTIN 300 MG/1
300 CAPSULE ORAL 2 TIMES DAILY
Status: DISCONTINUED | OUTPATIENT
Start: 2022-11-19 | End: 2022-11-19 | Stop reason: HOSPADM

## 2022-11-19 RX ADMIN — GABAPENTIN 300 MG: 300 CAPSULE ORAL at 09:50

## 2022-11-19 RX ADMIN — ASPIRIN 81 MG: 81 TABLET, COATED ORAL at 09:50

## 2022-11-19 RX ADMIN — GLIPIZIDE 5 MG: 5 TABLET, FILM COATED, EXTENDED RELEASE ORAL at 09:50

## 2022-11-19 RX ADMIN — METFORMIN HYDROCHLORIDE 1000 MG: 500 TABLET, FILM COATED ORAL at 09:50

## 2022-11-19 RX ADMIN — HYDROCHLOROTHIAZIDE 25 MG: 25 TABLET ORAL at 09:50

## 2022-11-19 RX ADMIN — Medication 2000 UNITS: at 09:50

## 2022-11-19 RX ADMIN — LOSARTAN POTASSIUM 50 MG: 50 TABLET, FILM COATED ORAL at 09:50

## 2022-11-19 RX ADMIN — TAMSULOSIN HYDROCHLORIDE 0.8 MG: 0.4 CAPSULE ORAL at 09:51

## 2022-11-19 ASSESSMENT — ACTIVITIES OF DAILY LIVING (ADL)
ADLS_ACUITY_SCORE: 35

## 2022-11-19 NOTE — PROGRESS NOTES
Patient transferred to [facility] surg from the ED today. He is discharging home with no services or DME. Attempted to call him with an  to discuss the Medicare Outpatient Observation Notice (MOON) form but he did not answer. Writer is unable to go into his room due to COVID precautions.     1:34 PM  Called patient again with an  and he answered. Discussed the MOON form and patient denied having any questions or concerns. Patient declined a copy of the form.     KAMARI Gonzalez RNCC  RN Care Coordinator   Office: [phone]   Pager: [phone]

## 2022-11-19 NOTE — DISCHARGE SUMMARY
Physical Therapy Discharge Summary    Reason for therapy discharge:    All goals and outcomes met, no further needs identified.    Progress towards therapy goal(s). See goals on Care Plan in [facility] electronic health record for goal details.  Goals met    Therapy recommendation(s):    Continued therapy is recommended.  Rationale/Recommendations:  OP PT for deconditioning .

## 2022-11-19 NOTE — PLAN OF CARE
Patient was admitted for observation at 0210. Pt came to the unit via w/c. Patient does not speak English. Patient speaks [place]. Denies pain. Ambulates independent in room.

## 2022-11-19 NOTE — PLAN OF CARE
Pt has been discharged home.  Pt denied  SOB,chest pain or dizziness  No new meds to take home  Discharged instructions given to patient via  services,and understanding was verbalized.  Piv taken  off ,Patient took along his belongings.  Patient is currently waiting in his room for  son to come and pick him up

## 2022-11-19 NOTE — PLAN OF CARE
[facility]  OUTPATIENT PHYSICAL THERAPY EVALUATION  PLAN OF TREATMENT FOR OUTPATIENT REHABILITATION  (COMPLETE FOR INITIAL CLAIMS ONLY)  Patient's Last Name, First Name, M.I.  YOB: 1950  Noemí Lassiter                           Provider's Name  [facility] Medical Record No.  [id number]                             Onset Date:  11/17/22   Start of Care Date:      Type:     _X_PT   ___OT   ___SLP Medical Diagnosis:                 PT Diagnosis:  functional strength deficit Visits from SOC:  1     See note for plan of treatment, functional goals and certification details    I CERTIFY THE NEED FOR THESE SERVICES FURNISHED UNDER        THIS PLAN OF TREATMENT AND WHILE UNDER MY CARE     (Physician co-signature of this document indicates review and certification of the therapy plan).

## 2022-11-19 NOTE — PLAN OF CARE
Diagnostic tests and consults completed and resulted   - No further episodes of syncope and any new arrhythmia addressed with controlled heart rates   - Vital signs normal or at patient baseline and orthostatic vitals are normal and patient not lightheaded with standing   - Tolerating oral intake to maintain hydration   - Safe disposition plan has been identified   - Nurse to notify provider when observation goals have been met and patient is ready for discharge.

## 2022-11-19 NOTE — PROGRESS NOTES
"   11/19/22 1100   Appointment Info   Signing Clinician's Name / Credentials (PT) Cherry Bangura, PT, DPT   Rehab Comments (PT) German  needed       Present yes   Language German (virtual)   Living Environment   People in Home alone   Current Living Arrangements apartment   Home Accessibility no concerns   Transportation Anticipated family or friend will provide   Living Environment Comments Pt lives alone in a apartment with stairs within the building; he also has elevator access in the building and has no concerns with stairs   Self-Care   Usual Activity Tolerance moderate   Current Activity Tolerance fair   Regular Exercise No   Equipment Currently Used at Home none   Fall history within last six months no   General Information   Onset of Illness/Injury or Date of Surgery 11/17/22   Referring Physician hayde Bryant MD   Patient/Family Therapy Goals Statement (PT) to go home   Pertinent History of Current Problem (include personal factors and/or comorbidities that impact the POC) per chart \"Noemí Lassiter is a 72 year old male with PMH of BPH, diabetes mellitus type II, hyperlipidemia, hypertension, and bilateral hearing loss who presents to the Emergency Department for evaluation of a syncopal episode earlier today and feeling weak\"   Cognition   Affect/Mental Status (Cognition) WNL   Orientation Status (Cognition) oriented x 4   Pain Assessment   Patient Currently in Pain Yes, see Vital Sign flowsheet   Bed Mobility   Bed Mobility no deficits identified   Transfers   Transfers no deficits identified   Gait/Stairs (Locomotion)   [place] (Gait) independent   Balance   Balance other (describe)   Balance Comments slightly cauitious secondary to wkns   Clinical Impression   Criteria for Skilled Therapeutic Intervention Yes, treatment indicated   PT Diagnosis (PT) functional strength deficit   Influenced by the following impairments syncope, weakness, fatigue   Functional " limitations due to impairments gait   Clinical Presentation (PT Evaluation Complexity) Evolving/Changing   Clinical Presentation Rationale per clinician judgement   Clinical Decision Making (Complexity) moderate complexity   Planned Therapy Interventions (PT) gait training;risk factor education;progressive activity/exercise;home program guidelines;strengthening   Anticipated Equipment Needs at Discharge (PT) other (see comments)   Risk & Benefits of therapy have been explained patient   PT Total Evaluation Time   PT Eval, Moderate Complexity Minutes (44860) 18   Physical Therapy Goals   PT Frequency One time eval and treatment only   PT Predicted Duration/Target Date for Goal Attainment 11/19/22   PT Goals Bed Mobility;Gait   PT: Bed Mobility Independent;Goal Met   PT: Gait Independent;Goal Met   Therapeutic Activity   Therapeutic Activities: dynamic activities to improve functional performance Minutes (17453) 15   Symptoms Noted During/After Treatment Fatigue   Treatment Detail/Skilled Intervention PT: pt supine in bed upon arrival; agreeable to session; completed subjective evaluation. Pt reports feeling fatigued, worked on sit to stands at EOB x 5 wth pt doing so independently. Pt completes all transfers in and out of bed IND. Ambulated around room with therapist SBA and pt taking seated rest breaks due to fatigue. Encouraged through breathing exercises in sitting. Pt ambulated a total of  6 times from recliner in room to opposite side of bed. Ensured all needs nearby, encouraged pt to continue ambulating in room to build tolerance and exited.   PT Discharge Planning   PT Plan discharge to home   PT Discharge Recommendation (DC Rec) home with outpatient physical therapy   PT Rationale for DC Rec Pt is currently at baseline level in terms of functional mobility and strength. He currently feels weak when up and ambulating but pt is also covid + and symptoms seem more secondary to positive status. Pt is IND with all  functional mobility. Recommend Home d/c with Outpatient PT for deconditioning.   PT Brief overview of current status IND   Total Session Time   Timed Code Treatment Minutes 15   Total Session Time (sum of timed and untimed services) 33

## 2022-11-19 NOTE — ED NOTES
[facility]   ED Nurse to Floor Handoff     Noemí Lassiter is a 72 year old male who speaks Egyptian and lives with family members,  in a home  They arrived in the ED by car from emergency room    ED Chief Complaint: Fatigue (Patient presents due to dizziness, weakness and incontinent of urine starting this morning. Patient has never had this happen before. Patient is diabetic and may have high blood sugar. Patient has not checked blood sugar today.)    ED Dx;   Final diagnoses:   Syncope, unspecified syncope type   Infection due to 2019 novel coronavirus         Needed?: Yes    Allergies: No Known Allergies.  Past Medical Hx:   Past Medical History:   Diagnosis Date    BPH (benign prostatic hyperplasia)     DM (diabetes mellitus), type 2 (H)     HLD (hyperlipidemia)     HTN (hypertension)       Baseline Mental status: WDL  Current Mental Status changes: at basesline    Infection present or suspected this encounter: no  Sepsis suspected: No  Isolation type: Special Precautions  Patient tested for COVID 19 prior to admission: YES     Activity level - Baseline/Home:  Independent  Activity Level - Current:   Independent    Bariatric equipment needed?: No    In the ED these meds were given:   Medications   0.9% sodium chloride BOLUS (0 mLs Intravenous Stopped 11/18/22 0352)     Followed by   sodium chloride 0.9% infusion ( Intravenous New Bag 11/18/22 0359)   Vitamin D3 (CHOLECALCIFEROL) tablet 2,000 Units (2,000 Units Oral Given 11/18/22 1801)   simvastatin (ZOCOR) tablet 40 mg (has no administration in time range)   tamsulosin (FLOMAX) capsule 0.8 mg (0.8 mg Oral Given 11/18/22 1803)   lidocaine 1 % 0.1-1 mL (has no administration in time range)   lidocaine (LMX4) cream (has no administration in time range)   sodium chloride (PF) 0.9% PF flush 3 mL (3 mLs Intracatheter Given 11/18/22 1809)   sodium chloride (PF) 0.9% PF flush 3 mL (has no administration in time  range)   nitroGLYcerin (NITROSTAT) sublingual tablet 0.4 mg (has no administration in time range)   ondansetron (ZOFRAN ODT) ODT tab 4 mg (has no administration in time range)     Or   ondansetron (ZOFRAN) injection 4 mg (has no administration in time range)   acetaminophen (TYLENOL) tablet 650 mg (has no administration in time range)   Medication instructions: Do NOT use nebulized medications (has no administration in time range)   enoxaparin ANTICOAGULANT (LOVENOX) injection 40 mg (40 mg Subcutaneous Given 11/18/22 1810)   aspirin EC tablet 81 mg (81 mg Oral Given 11/18/22 1801)   glucose gel 15-30 g (has no administration in time range)     Or   dextrose 50 % injection 25-50 mL (has no administration in time range)     Or   glucagon injection 1 mg (has no administration in time range)   insulin aspart (NovoLOG) injection (RAPID ACTING) (1 Units Subcutaneous Not Given 11/18/22 1734)   insulin aspart (NovoLOG) injection (RAPID ACTING) (has no administration in time range)   hydrochlorothiazide (HYDRODIURIL) tablet 25 mg (25 mg Oral Given 11/18/22 2002)   losartan (COZAAR) tablet 50 mg (50 mg Oral Given 11/18/22 1802)   hydrALAZINE (APRESOLINE) injection 10 mg (10 mg Intravenous Given 11/18/22 1805)   metFORMIN (GLUCOPHAGE) tablet 1,000 mg (1,000 mg Oral Given 11/18/22 1801)       Drips running?  No    Home pump  No    Current LDAs  Peripheral IV 11/17/22 Anterior;Right Upper forearm (Active)   Site Assessment WDL 11/17/22 2325   Line Status Saline locked 11/17/22 2327   Number of days: 1       Labs results:   Labs Ordered and Resulted from Time of ED Arrival to Time of ED Departure   GLUCOSE BY METER - Abnormal       Result Value    GLUCOSE BY METER POCT 173 (*)    COMPREHENSIVE METABOLIC PANEL - Abnormal    Sodium 135      Potassium 5.0      Chloride 102      Carbon Dioxide (CO2) 28      Anion Gap 5      Urea Nitrogen 16      Creatinine 1.02      Calcium 9.8      Glucose 162 (*)     Alkaline Phosphatase 55       AST 39      ALT 27      Protein Total 7.6      Albumin 3.5      Bilirubin Total 0.6      GFR Estimate 78     ROUTINE UA WITH MICROSCOPIC REFLEX TO CULTURE - Abnormal    Color Urine Yellow      Appearance Urine Clear      Glucose Urine 70 (*)     Bilirubin Urine Negative      Ketones Urine Trace (*)     Specific Gravity Urine 1.020      Blood Urine Negative      pH Urine 5.5      Protein Albumin Urine 20 (*)     Urobilinogen Urine Normal      Nitrite Urine Negative      Leukocyte Esterase Urine Negative      Bacteria Urine Few (*)     Mucus Urine Present (*)     RBC Urine 2      WBC Urine 1      Hyaline Casts Urine 1     CBC WITH PLATELETS AND DIFFERENTIAL - Abnormal    WBC Count 4.8      RBC Count 4.62      Hemoglobin 12.9 (*)     Hematocrit 39.9 (*)     MCV 86      MCH 27.9      MCHC 32.3      RDW 12.0      Platelet Count 114 (*)    HEMOGLOBIN A1C - Abnormal    Hemoglobin A1C 8.0 (*)    COVID-19 VIRUS (CORONAVIRUS) BY PCR - Abnormal    SARS CoV2 PCR Positive (*)    GLUCOSE BY METER - Abnormal    GLUCOSE BY METER POCT 128 (*)    LACTIC ACID WHOLE BLOOD - Normal    Lactic Acid 1.0     TROPONIN I - Normal    Troponin I High Sensitivity 9     D DIMER QUANTITATIVE - Normal    D-Dimer Quantitative 0.32     POTASSIUM - Normal    Potassium 4.0     TROPONIN I - Normal    Troponin I High Sensitivity 10     GLUCOSE MONITOR NURSING POCT   DIFFERENTIAL    % Neutrophils 33      % Lymphocytes 43      % Monocytes 23      % Eosinophils 1      % Basophils 0      Absolute Neutrophils 1.6      Absolute Lymphocytes 2.1      Absolute Monocytes 1.1      Absolute Eosinophils 0.0      Absolute Basophils 0.0      RBC Morphology Confirmed RBC Indices      Platelet Assessment        Value: Automated Count Confirmed. Platelet morphology is normal.       Imaging Studies:   Recent Results (from the past 24 hour(s))   XR Chest 2 Views    Narrative    EXAM: XR CHEST 2 VIEWS  LOCATION: [facility]  "[place]  DATE/TIME: 11/17/2022 11:50 PM    INDICATION: Chest pain  COMPARISON: 08/07/2020      Impression    IMPRESSION: Minimal linear scarring or atelectasis left lung base. No pulmonary infiltrates. Minimal pleural fluid in the posterior costophrenic angle. Normal heart size and pulmonary vascularity. Degenerative hypertrophic changes in the spine and right   shoulder.   CT Head w/o Contrast    Narrative    EXAM: CT HEAD W/O CONTRAST  LOCATION: [facility]  DATE/TIME: 11/17/2022 11:52 PM    INDICATION: Sudden onset headache.  COMPARISON: None.  TECHNIQUE: Routine CT Head without IV contrast. Multiplanar reformats. Dose reduction techniques were used.    FINDINGS:  INTRACRANIAL CONTENTS: No intracranial hemorrhage, extraaxial collection, or mass effect.  No acute large territory infarction. Small region of encephalomalacia identified involving the high left anterior frontal lobe, likely reflecting a sequela of   remote injury, likely ischemic. Mild presumed chronic small vessel ischemic changes. Mild generalized volume loss. No hydrocephalus.     VISUALIZED ORBITS/SINUSES/MASTOIDS: Status post right globe prosthesis. No paranasal sinus mucosal disease. No middle ear or mastoid effusion.    BONES/SOFT TISSUES: No acute abnormality.      Impression    IMPRESSION:  1.  No CT evidence for acute intracranial process.  2.  Chronic changes, as above.       Recent vital signs:   BP (!) 149/58   Pulse 78   Temp 98.4  F (36.9  C) (Oral)   Resp 18   Ht 1.676 m (5' 6\")   Wt 76.7 kg (169 lb)   SpO2 98%   BMI 27.28 kg/m      Vaughn Coma Scale Score: 15 (11/18/22 2153)       Cardiac Rhythm: Normal Sinus  Pt needs tele? No  Skin/wound Issues: None    Code Status: Full Code    Pain control: good    Nausea control: good    Abnormal labs/tests/findings requiring intervention:     Family present during ED course? No   Family Comments/Social Situation comments:     Tasks needing " completion: None    Robles Nieves, RN  ascom --   [phone] [facility]  [phone] ED

## 2022-11-19 NOTE — DISCHARGE SUMMARY
[facility]  Hospitalist Discharge Summary      Date of Admission:  11/17/2022  Date of Discharge:  11/19/2022  Discharging Provider: Conor Kemp MD  Discharge Service: Hospitalist Service, GOLD TEAM 16    Discharge Diagnoses   Syncope  Asymptomatic COVID-19 infection    Follow-ups Needed After Discharge      Follow up with primary care provider, Physician No Ref-Primary, within 7   days for post hospitalization follow up       Discharge Disposition   Discharged to home  Condition at discharge: Stable    Hospital Course   Noemí Lassiter is a 71 yo [place] gentleman w/ h/o HTN, HLD, T2DM, BPH and bilateral hearing loss.  He presented to [facility] ED on 11/17/22 for evaluation of an episode of syncope.  Patient has been feeling generally weak.  He reports increased urinary frequency and at some point he had difficulty with urinary incontinence.  He denied dysuria.  He denies chills or fever.  He denies cough, SOB, sore throat or HA.  No sick contact.  Patient was unable to describe the syncopal episode.   No injury or LOC.  No prior similar symptoms.  His initial vital signs were temp 99.0  F, /83, HR 87, RR 16 and 98% sat on RA.  Neuro exam was nonfocal.  CT head without contrast negative for acute intracranial pathology.  EKG and high-sensitivity troponin were negative for ischemia.  Patient was admitted for further evaluation of syncope.    Syncope:     ---   Suspect orthostatic hypotension vs vasovagal related to COVID-19 infection  ---   CT head without contrast negative for acute intracranial pathology.     ---   No pneumonia on CXR  ---   Ruled out for ACS with EKG and high-sensitivity troponin  ---   Telemetry since admit was negative for dysrhythmia  ---   TTE on 11/18 revealed EF 55-60%, normal RV function, no significant valvular abnormalities.  ---   Orthostatic vitals ordered but was not performed  ---   No syncopal episode past 36  hours  ---   Patient is able to ambulate without any difficulties    Asymptomatic COVID-19 infection  ---   Fully vaccinated but not boosted  ---   SARS-CoV-2 PCR obtained for universal screening prior to admit on 11/18 was positive  ---   On RA  ---   No COVID-related symptoms  ---   No indication for COVID directed therapy  ---   He received enoxaparin for DVT prophylaxis  ---   10 days of quarantine recommended, 11/18 - 11/28/22    HTN  ---   Resume PTA losartan    T2DM  ---   Resume PTA metformin    Urinary urgency/incontinence  ---   Likely due to BPH  ---   Admit UA grossly negative for infection  ---   Resume PTA Flomax      Consultations This Hospital Stay   MEDICATION HISTORY IP PHARMACY CONSULT  PHYSICAL THERAPY ADULT IP CONSULT    Code Status   Full Code    Time Spent on this Encounter   IConor MD, personally saw the patient today and spent greater than 30 minutes discharging this patient.       Conor Kemp MD  [facility] MED SURG  [facility] [address]  Phone: [phone]  Fax: [phone]  ______________________________________________________________________    Physical Exam   Vital Signs: Temp: 98  F (36.7  C) Temp src: Oral BP: (!) 148/62 Pulse: 69   Resp: 16 SpO2: 97 % O2 Device: None (Room air)    Weight: 169 lbs 0 oz   General: aao x 3, NAD.  HEENT:  NC/AT, neck supple  CVS:  NL s 1 and s2, no m/r/g.  Lungs:  CTA B/L.   Abd:  Soft, + bs, NT, no rebound or gaurding, no fluid shift.  Ext:  No c/c.  Lymph:  No edema.  Neuro:  Nonfocal.  Musculoskeletal: No calf tenderness to palpation.    Skin:  No rash.  Psychiatry:  Mood and affect appropriate.         Primary Care Physician   Physician No Ref-Primary    Discharge Orders      Reason for your hospital stay    Syncope     Activity    Your activity upon discharge: activity as tolerated     Follow Up and recommended labs and tests    Follow up with primary care provider, Physician No Ref-Primary,  within 7 days for post hospitalization follow up     Diet    Follow this diet upon discharge:  Regular       Discharge Medications   Current Discharge Medication List      CONTINUE these medications which have NOT CHANGED    Details   cholecalciferol 50 MCG (2000 UT) tablet Take 2,000 Units by mouth daily      gabapentin (NEURONTIN) 300 MG capsule Take 300 mg by mouth 2 times daily      glipiZIDE (GLUCOTROL XL) 5 MG 24 hr tablet Take 5 mg by mouth daily      losartan (COZAAR) 50 MG tablet Take 50 mg by mouth daily      metFORMIN (GLUCOPHAGE) 1000 MG tablet Take 1,000 mg by mouth 2 times daily (with meals)      simvastatin (ZOCOR) 40 MG tablet Take 1 tablet (40 mg) by mouth At Bedtime  Qty: 30 tablet    Associated Diagnoses: Hyperlipidemia, unspecified hyperlipidemia type      acetaminophen (TYLENOL) 325 MG tablet Take 650 mg by mouth every 6 hours as needed      Alcohol Swabs (ALCOHOL PREP) PADS 1 each 2 times daily  Qty: 100 each, Refills: 3    Associated Diagnoses: Type 2 diabetes mellitus without complication, without long-term current use of insulin (H)      aspirin 81 MG tablet Take 1 tablet (81 mg) by mouth daily  Qty: 60 tablet, Refills: 0    Associated Diagnoses: Encounter for routine adult health examination without abnormal findings      B Complex Vitamins (VITAMIN-B COMPLEX) TABS       blood glucose monitoring (MANUEL CONTOUR NEXT) test strip Use to test blood sugar 2 times daily or as directed.  Ok to substitute alternative if insurance prefers.  Qty: 100 strip, Refills: prn    Associated Diagnoses: Type 2 diabetes mellitus without complication, without long-term current use of insulin (H)      ibuprofen (ADVIL/MOTRIN) 600 MG tablet Take 600 mg by mouth every 6 hours as needed      Multiple Vitamin (DAILY VITES) TABS TAKE 1 TABLET BY MOUTH EVERY DAY      tamsulosin (FLOMAX) 0.4 MG capsule Take 0.8 mg by mouth daily         STOP taking these medications       ASPIR-LOW 81 MG EC tablet Comments:   Reason  for Stopping:         hydrochlorothiazide 12.5 MG TABS tablet Comments:   Reason for Stopping:             Allergies   No Known Allergies

## 2024-11-04 NOTE — MR AVS SNAPSHOT
After Visit Summary   10/4/2018    Noemí Lassiter    MRN: [id number]           Patient Information     Date Of Birth          1950        Visit Information        Provider Department      10/4/2018 12:00 PM Sanjuana Sifuentes [facility] Audiology        Today's Diagnoses     Sensorineural hearing loss, bilateral    -  1       Follow-ups after your visit        Who to contact     Please call your clinic at [phone] to:    Ask questions about your health    Make or cancel appointments    Discuss your medicines    Learn about your test results    Speak to your doctor            Additional Information About Your Visit        Care EveryWhere ID     This is your Care EveryWhere ID. This could be used by other organizations to access your [place] medical records  [id number]         Blood Pressure from Last 3 Encounters:   12/11/17 134/66   11/07/17 148/77   10/06/17 160/76    Weight from Last 3 Encounters:   01/12/18 77.8 kg (171 lb 9.6 oz)   12/11/17 80.1 kg (176 lb 9.6 oz)   11/07/17 78.5 kg (173 lb)              We Performed the Following     Hearing Aid Check, Binaural ([id number])        Primary Care Provider Office Phone # Fax #    Bay MD Angela [phone]       [address] [address]        Equal Access to Services     OFELIA ALEXIS AH: Hadii aad ku hadasho Soomaali, waaxda luqadaha, qaybta kaalmada adeegyada, waxrony idiin hayaan reza valladares. So [facility] [phone].    ATENCIÓN: Si habla español, tiene a kern disposición servicios gratuitos de asistencia lingüística. Llame al [phone].    We comply with applicable federal civil rights laws and [place] laws. We do not discriminate on the basis of race, color, national origin, age, disability, sex, sexual orientation, or gender identity.            Thank you!     Thank you for choosing [facility] AUDIOLOGY  for your care. Our goal is always to provide you with excellent care.  Intubation    Date/Time: 11/4/2024 10:11 AM    Performed by: Neto Thomson CRNA  Authorized by: Adrian Givens MD    Intubation:     Induction:  Rapid sequence induction    Intubated:  Postinduction    Mask Ventilation:  Not attempted    Attempts:  1    Attempted By:  CRNA    Method of Intubation:  Video laryngoscopy    Blade:  Flower 3    Laryngeal View Grade: Grade I - full view of cords      Difficult Airway Encountered?: No      Complications:  None    Airway Device:  Oral endotracheal tube    Airway Device Size:  7.0    Style/Cuff Inflation:  Cuffed (inflated to minimal occlusive pressure)    Tube secured:  22    Secured at:  The teeth    Placement Verified By:  Capnometry    Complicating Factors:  None    Findings Post-Intubation:  BS equal bilateral and atraumatic/condition of teeth unchanged       Hearing back from our patients is one way we can continue to improve our services. Please take a few minutes to complete the written survey that you may receive in the mail after your visit with us. Thank you!             Your Updated Medication List - Protect others around you: Learn how to safely use, store and throw away your medicines at www.disposemymeds.org.          This list is accurate as of 10/4/18  3:24 PM.  Always use your most recent med list.                   Brand Name Dispense Instructions for use Diagnosis    acetaminophen 325 MG tablet    TYLENOL     Take 650 mg by mouth        Alcohol Prep Pads     100 each    1 each 2 times daily    Type 2 diabetes mellitus without complication, without long-term current use of insulin (H)       aspirin 81 MG tablet     60 tablet    Take 1 tablet (81 mg) by mouth daily    Encounter for routine adult health examination without abnormal findings       blood glucose monitoring test strip    MANUEL CONTOUR NEXT    100 strip    Use to test blood sugar 2 times daily or as directed.  Ok to substitute alternative if insurance prefers.    Type 2 diabetes mellitus without complication, without long-term current use of insulin (H)       cyclobenzaprine 5 MG tablet    FLEXERIL    8 tablet    Take 1 tablet (5 mg) by mouth 2 times daily as needed for muscle spasms        D 2000 2000 units tablet   Generic drug:  cholecalciferol      Take 2,000 Units by mouth        DAILY VITES Tabs      TAKE 1 TABLET BY MOUTH EVERY DAY        hydrochlorothiazide 12.5 MG Tabs tablet     180 tablet    Take 2 tablets (25 mg) by mouth daily    Benign essential hypertension       ibuprofen 600 MG tablet    ADVIL/MOTRIN     Take 600 mg by mouth        losartan 100 MG tablet    COZAAR    90 tablet    Take 1 tablet (100 mg) by mouth daily    Benign essential hypertension       metFORMIN 500 MG 24 hr tablet    GLUCOPHAGE-XR    60 tablet    Take 1 tablet (500 mg) by mouth 2 times daily (with meals)     Type 2 diabetes mellitus without complication, without long-term current use of insulin (H)       simvastatin 40 MG tablet    ZOCOR    30 tablet    Take 1 tablet (40 mg) by mouth At Bedtime    Hyperlipidemia, unspecified hyperlipidemia type       tamsulosin 0.4 MG capsule    FLOMAX     Take 0.4 mg by mouth        tolterodine 4 MG 24 hr capsule    DETROL LA    30 capsule    Take 1 capsule (4 mg) by mouth daily    Urge incontinence of urine       Vitamin-B Complex Tabs

## 2025-08-18 ENCOUNTER — HOSPITAL ENCOUNTER (EMERGENCY)
Facility: CLINIC | Age: 75
Discharge: HOME | End: 2025-08-18
Attending: STUDENT IN AN ORGANIZED HEALTH CARE EDUCATION/TRAINING PROGRAM
Payer: COMMERCIAL

## 2025-08-18 ENCOUNTER — APPOINTMENT (OUTPATIENT)
Dept: CT IMAGING | Facility: CLINIC | Age: 75
End: 2025-08-18
Payer: COMMERCIAL

## 2025-08-18 ASSESSMENT — ACTIVITIES OF DAILY LIVING (ADL)
ADLS_ACUITY_SCORE: 41

## 2025-11-26 ENCOUNTER — PRE VISIT (OUTPATIENT)
Dept: NEUROLOGY | Facility: CLINIC | Age: 75
End: 2025-11-26

## (undated) RX ORDER — LIDOCAINE HYDROCHLORIDE 20 MG/ML
JELLY TOPICAL
Status: DISPENSED
Start: 2019-09-24